# Patient Record
Sex: MALE | Race: WHITE | NOT HISPANIC OR LATINO | Employment: FULL TIME | URBAN - METROPOLITAN AREA
[De-identification: names, ages, dates, MRNs, and addresses within clinical notes are randomized per-mention and may not be internally consistent; named-entity substitution may affect disease eponyms.]

---

## 2017-02-24 ENCOUNTER — ALLSCRIPTS OFFICE VISIT (OUTPATIENT)
Dept: OTHER | Facility: OTHER | Age: 42
End: 2017-02-24

## 2017-03-07 ENCOUNTER — LAB CONVERSION - ENCOUNTER (OUTPATIENT)
Dept: OTHER | Facility: OTHER | Age: 42
End: 2017-03-07

## 2017-03-07 ENCOUNTER — GENERIC CONVERSION - ENCOUNTER (OUTPATIENT)
Dept: OTHER | Facility: OTHER | Age: 42
End: 2017-03-07

## 2017-03-07 LAB
25(OH)D3 SERPL-MCNC: 52 NG/ML (ref 30–100)
CHOLEST SERPL-MCNC: 230 MG/DL (ref 125–200)
CHOLEST/HDLC SERPL: 3.2 (CALC)
HDLC SERPL-MCNC: 71 MG/DL
HEPATITIS B SURFACE ANTIGEN (HISTORICAL): NORMAL
HEPATITIS C ANTIBODY (HISTORICAL): NORMAL
HIV AG/AB, 4TH GEN (HISTORICAL): NORMAL
LDL CHOLESTEROL (HISTORICAL): 139 MG/DL (CALC)
NON-HDL-CHOL (CHOL-HDL) (HISTORICAL): 159 MG/DL (CALC)
SIGNAL TO CUT-OFF (HISTORICAL): 0.01
TRIGL SERPL-MCNC: 99 MG/DL

## 2017-05-21 ENCOUNTER — APPOINTMENT (EMERGENCY)
Dept: RADIOLOGY | Facility: HOSPITAL | Age: 42
End: 2017-05-21
Payer: COMMERCIAL

## 2017-05-21 ENCOUNTER — HOSPITAL ENCOUNTER (EMERGENCY)
Facility: HOSPITAL | Age: 42
Discharge: HOME | End: 2017-05-21
Attending: EMERGENCY MEDICINE | Admitting: EMERGENCY MEDICINE
Payer: COMMERCIAL

## 2017-05-21 VITALS
DIASTOLIC BLOOD PRESSURE: 82 MMHG | TEMPERATURE: 98.2 F | HEART RATE: 88 BPM | RESPIRATION RATE: 18 BRPM | OXYGEN SATURATION: 98 % | WEIGHT: 170.42 LBS | SYSTOLIC BLOOD PRESSURE: 140 MMHG

## 2017-05-21 DIAGNOSIS — S86.011A ACHILLES RUPTURE, RIGHT, INITIAL ENCOUNTER: Primary | ICD-10-CM

## 2017-05-21 PROCEDURE — 73610 X-RAY EXAM OF ANKLE: CPT

## 2017-05-21 PROCEDURE — 99283 EMERGENCY DEPT VISIT LOW MDM: CPT

## 2017-05-21 RX ORDER — OXYCODONE HYDROCHLORIDE AND ACETAMINOPHEN 5; 325 MG/1; MG/1
1 TABLET ORAL EVERY 6 HOURS PRN
Qty: 12 TABLET | Refills: 0 | Status: SHIPPED | OUTPATIENT
Start: 2017-05-21 | End: 2017-05-31

## 2017-05-21 RX ORDER — IBUPROFEN 800 MG/1
800 TABLET ORAL EVERY 6 HOURS PRN
Qty: 30 TABLET | Refills: 0 | Status: SHIPPED | OUTPATIENT
Start: 2017-05-21 | End: 2018-02-28 | Stop reason: SDUPTHER

## 2017-05-21 RX ORDER — CITALOPRAM 40 MG/1
40 TABLET ORAL DAILY
COMMUNITY
End: 2018-02-28 | Stop reason: DRUGHIGH

## 2017-05-21 RX ORDER — IBUPROFEN 400 MG/1
800 TABLET ORAL ONCE
Status: COMPLETED | OUTPATIENT
Start: 2017-05-21 | End: 2017-05-21

## 2017-05-21 RX ADMIN — IBUPROFEN 800 MG: 400 TABLET ORAL at 19:49

## 2017-05-23 ENCOUNTER — TRANSCRIBE ORDERS (OUTPATIENT)
Dept: ADMINISTRATIVE | Facility: HOSPITAL | Age: 42
End: 2017-05-23

## 2017-05-23 DIAGNOSIS — M66.371 SPONTANEOUS RUPTURE OF FLEXOR TENDONS, RIGHT ANKLE AND FOOT: Primary | ICD-10-CM

## 2017-05-25 ENCOUNTER — HOSPITAL ENCOUNTER (OUTPATIENT)
Dept: MRI IMAGING | Facility: HOSPITAL | Age: 42
Discharge: HOME | End: 2017-05-25
Payer: COMMERCIAL

## 2017-05-25 DIAGNOSIS — M66.371 SPONTANEOUS RUPTURE OF FLEXOR TENDONS, RIGHT ANKLE AND FOOT: ICD-10-CM

## 2017-05-25 PROCEDURE — 73721 MRI JNT OF LWR EXTRE W/O DYE: CPT

## 2017-05-30 ENCOUNTER — GENERIC CONVERSION - ENCOUNTER (OUTPATIENT)
Dept: OTHER | Facility: OTHER | Age: 42
End: 2017-05-30

## 2018-01-13 VITALS
WEIGHT: 164.38 LBS | BODY MASS INDEX: 24.35 KG/M2 | HEIGHT: 69 IN | OXYGEN SATURATION: 99 % | HEART RATE: 73 BPM | SYSTOLIC BLOOD PRESSURE: 126 MMHG | DIASTOLIC BLOOD PRESSURE: 78 MMHG

## 2018-01-16 NOTE — RESULT NOTES
Verified Results  (Q) LIPID PANEL WITH REFLEX TO DIRECT LDL [id number] 04:47PM [facility]     Test Name Result Flag Reference   CHOLESTEROL, TOTAL 230 mg/dL H 125-200   HDL CHOLESTEROL 71 mg/dL  > OR = 40   TRIGLICERIDES 99 mg/dL  <644   LDL-CHOLESTEROL 139 mg/dL (calc) H <130   Desirable range <100 mg/dL for patients with CHD or  diabetes and <70 mg/dL for diabetic patients with  known heart disease  CHOL/HDLC RATIO 3 2 (calc)  < OR = 5 0   NON HDL CHOLESTEROL 159 mg/dL (calc)     Target for non-HDL cholesterol is 30 mg/dL higher than   LDL cholesterol target  *(Q) VITAMIN D, 25-HYDROXY, LC/MS/MS [id number] 04:47PM [facility]   REPORT COMMENT:  FASTING:YES     Test Name Result Flag Reference   VITAMIN D, 25-OH, TOTAL 52 ng/mL     Vitamin D Status         25-OH Vitamin D:     Deficiency:                    <20 ng/mL  Insufficiency:             20 - 29 ng/mL  Optimal:                 > or = 30 ng/mL     For 25-OH Vitamin D testing on patients on   D2-supplementation and patients for whom quantitation   of D2 and D3 fractions is required, the QuestAssureD(TM)  25-OH VIT D, (D2,D3), LC/MS/MS is recommended: order   code 91361 (patients >2yrs)  For more information on this test, go to:  [email or web address] [email or web address]  (This link is being provided for   informational/educational purposes only )     (Q) HEPATITIS C ANTIBODY [id number] 04:47PM [facility]     Test Name Result Flag Reference   HEPATITIS C ANTIBODY NON-REACTIVE  NON-REACTIVE   SIGNAL TO CUT-OFF 0 01  <1 00     (1) HEP B SURFACE ANTIGEN [id number] 04:47PM [facility]     Test Name Result Flag Reference   HEPATITIS B SURFACE$ANTIGEN NON-REACTIVE  NON-REACTIVE     (1) HIV AG/AB Rozena Tereso GEN [id number] 04:47PM [facility]     Test Name Result Flag Reference   HIV AG/AB, 4TH GEN NON-REACTIVE  NON-REACTIVE   HIV-1 antigen and HIV-1/HIV-2 antibodies were not  detected  There is no laboratory evidence of HIV  infection       PLEASE NOTE: This information has been disclosed to  you from records whose confidentiality may be  protected by state law  If your state requires such  protection, then the state law prohibits you from  making any further disclosure of the information  without the specific written consent of the person  to whom it pertains, or as otherwise permitted by law  A general authorization for the release of medical or  other information is NOT sufficient for this purpose  For additional information please refer to  http://[employer]/faq/UXJ371  (This link is being provided for informational/  educational purposes only )        The performance of this assay has not been clinically  validated in patients less than 3years old  (Q) CBC (INCLUDES DIFF/PLT) (REFL) [id number] 04:42PM Dinah Mote     Test Name Result Flag Reference   WHITE BLOOD CELL COUNT 6 6 Thousand/uL  3 8-10 8   RED BLOOD CELL COUNT 4 88 Million/uL  4 20-5 80   HEMOGLOBIN 15 6 g/dL  13 2-17 1   HEMATOCRIT 45 7 %  38 5-50 0   MCV 93 6 fL  80 0-100 0   MCH 31 9 pg  27 0-33 0   MCHC 34 1 g/dL  32 0-36 0   RDW 12 5 %  11 0-15 0   PLATELET COUNT 055 Thousand/uL  140-400   MPV 8 7 fL  7 5-12 5   ABSOLUTE NEUTROPHILS 4561 cells/uL  3021-5681   ABSOLUTE LYMPHOCYTES 1604 cells/uL  850-3900   ABSOLUTE MONOCYTES 310 cells/uL  200-950   ABSOLUTE EOSINOPHILS 99 cells/uL     ABSOLUTE BASOPHILS 26 cells/uL  0-200   NEUTROPHILS 69 1 %     LYMPHOCYTES 24 3 %     MONOCYTES 4 7 %     EOSINOPHILS 1 5 %     BASOPHILS 0 4 %       (Q) COMPREHENSIVE METABOLIC PNL W/ADJUSTED CALCIUM [id number] 04:42PM [facility] M[facility]     Test Name Result Flag Reference   GLUCOSE 84 mg/dL  65-99   Fasting reference interval   UREA NITROGEN (BUN) 14 mg/dL  7-25   CREATININE 1 04 mg/dL  0 60-1 35   eGFR NON-AFR   AMERICAN 90 mL/min/1 73m2  > OR = 60   eGFR AFRICAN AMERICAN 104 mL/min/1 73m2  > OR = 60   BUN/CREATININE RATIO   3-81   NOT APPLICABLE (calc)   SODIUM 136 mmol/L  135-146 POTASSIUM 3 9 mmol/L  3 5-5 3   CHLORIDE 99 mmol/L     CARBON DIOXIDE 25 mmol/L  20-31   CALCIUM 9 7 mg/dL  8 6-10 3   CALCIUM (ADJUSTED FOR$ALBUMIN) 9 5 mg/dL (calc)  8 6-10 2   PROTEIN, TOTAL 7 7 g/dL  6 1-8 1   ALBUMIN 4 7 g/dL  3 6-5 1   GLOBULIN 3 0 g/dL (calc)  1 9-3 7   ALBUMIN/GLOBULIN RATIO 1 6 (calc)  1 0-2 5   BILIRUBIN, TOTAL 1 0 mg/dL  0 2-1 2   ALKALINE PHOSPHATASE 56 U/L     AST 24 U/L  10-40   ALT 38 U/L  9-46     (Q) TSH, 3RD GENERATION [id number] 04:42PM [facility]ue[facility]   REPORT COMMENT:  FASTING:YES     Test Name Result Flag Reference   TSH 2 61 mIU/L  0 40-4 50       Discussion/Summary   Your blood tests look fine  Your cholesterol is very slightly high so please watch your diet       Signatures   Electronically signed by : SIM Duncan ; Mar  7 2017  3:01PM EST                       (Author)

## 2018-01-17 NOTE — RESULT NOTES
Message   His vitamin D level is quite low at 12, ideally >30   I will send for Vit d 50,000 units to be taken weekly x 8 weeks        Verified Results  (Q) CBC (INCLUDES DIFF/PLT) (REFL) [id number] 04:16PM Stanly Pat     Test Name Result Flag Reference   WHITE BLOOD CELL COUNT 6 2 Thousand/uL  3 8-10 8   RED BLOOD CELL COUNT 4 85 Million/uL  4 20-5 80   HEMOGLOBIN 14 9 g/dL  13 2-17 1   HEMATOCRIT 44 9 %  38 5-50 0   MCV 92 7 fL  80 0-100 0   MCH 30 6 pg  27 0-33 0   MCHC 33 1 g/dL  32 0-36 0   RDW 12 3 %  11 0-15 0   PLATELET COUNT 656 Thousand/uL  140-400   MPV 8 8 fL  7 5-11 5   ABSOLUTE NEUTROPHILS 4439 cells/uL  3114-4180   ABSOLUTE LYMPHOCYTES 1333 cells/uL  850-3900   ABSOLUTE MONOCYTES 304 cells/uL  200-950   ABSOLUTE EOSINOPHILS 99 cells/uL     ABSOLUTE BASOPHILS 25 cells/uL  0-200   NEUTROPHILS 71 6 %     LYMPHOCYTES 21 5 %     MONOCYTES 4 9 %     EOSINOPHILS 1 6 %     BASOPHILS 0 4 %       (Q) COMPREHENSIVE METABOLIC PNL W/ADJUSTED CALCIUM [id number] 04:16PM Stanly Pat     Test Name Result Flag Reference   GLUCOSE 75 mg/dL  65-99   Fasting reference interval   UREA NITROGEN (BUN) 10 mg/dL  7-25   CREATININE 1 08 mg/dL  0 60-1 35   eGFR NON-AFR   AMERICAN 87 mL/min/1 73m2  > OR = 60   eGFR AFRICAN AMERICAN 100 mL/min/1 73m2  > OR = 60   BUN/CREATININE RATIO   0-19   NOT APPLICABLE (calc)   SODIUM 138 mmol/L  135-146   POTASSIUM 4 1 mmol/L  3 5-5 3   CHLORIDE 103 mmol/L     CARBON DIOXIDE 27 mmol/L  19-30   CALCIUM 9 5 mg/dL  8 6-10 3   CALCIUM (ADJUSTED FOR$ALBUMIN) 9 4 mg/dL (calc)  8 6-10 2   PROTEIN, TOTAL 7 5 g/dL  6 1-8 1   ALBUMIN 4 6 g/dL  3 6-5 1   GLOBULIN 2 9 g/dL (calc)  1 9-3 7   ALBUMIN/GLOBULIN RATIO 1 6 (calc)  1 0-2 5   BILIRUBIN, TOTAL 0 7 mg/dL  0 2-1 2   ALKALINE PHOSPHATASE 53 U/L     AST 19 U/L  10-40   ALT 35 U/L  9-46     (Q) TSH, 3RD GENERATION [id number] 04:16PM Stanly Pat     Test Name Result Flag Reference   TSH 2 23 mIU/L  0 40-4 50     *(Q) VITAMIN D, 25-HYDROXY, LC/MS/MS [id number] 04:16PM [facility]i Barkley   REPORT COMMENT:  FASTING:NO     Test Name Result Flag Reference   VITAMIN D, 25-OH, TOTAL 12 ng/mL L    Vitamin D Status         25-OH Vitamin D:     Deficiency:                    <20 ng/mL  Insufficiency:             20 - 29 ng/mL  Optimal:                 > or = 30 ng/mL     For 25-OH Vitamin D testing on patients on   D2-supplementation and patients for whom quantitation   of D2 and D3 fractions is required, the QuestAssureD(TM)  25-OH VIT D, (D2,D3), LC/MS/MS is recommended: order   code 78481 (patients >2yrs)  For more information on this test, go to:  [email or web address]/FSP004  (This link is being provided for   informational/educational purposes only )     (1) HIV AG/AB COMBO, 4TH GEN [id number] 04:16PM Abi Barkley     Test Name Result Flag Reference   HIV AG/AB, 4TH GEN NON-REACTIVE  NON-REACTIVE   A Nonreactive HIV Ag/Ab result does not exclude  HIV infection since the time frame for seroconversion  is variable  If acute HIV infection is suspected,  a HIV-1 RNA Qualitative TMA test is recommended  PLEASE NOTE: This information has been disclosed to you  from records whose confidentiality may be protected by  state law  If your state requires such protection, then  the state law prohibits you from making any further  disclosure of the information without the specific  written consent of the person to whom it pertains, or  as otherwise permitted by law  A general authorization  for the release of medical or other information is NOT  sufficient for this purpose  The performance of this assay has not been clinically  validated in patients less than 3years old  For additional information please refer to  http://[email or web address]/PJO802  (This link is being provided for informational/  educational purposes only )       Plan  Vitamin D deficiency    · Vitamin D (Ergocalciferol) 13208 UNIT Oral Capsule; take 1 capsule weekly    Signatures   Electronically signed by : SIM Chang ; Feb 29 2016  1:29PM EST                       (Author)

## 2018-01-17 NOTE — PROGRESS NOTES
Assessment    1  Encounter for preventive health examination (V70 0) (Z00 00)    Plan  Screening cholesterol level, Screening for diabetes mellitus, Screening for STD (sexually  transmitted disease), Vitamin D deficiency    · (1) HEP B SURFACE ANTIGEN; Status:Active; Requested for:27Skg1275;    · (Q) CBC (INCLUDES DIFF/PLT) (REFL); Status:Active; Requested for:25Rib7201;    · (Q) COMPREHENSIVE METABOLIC PNL W/ADJUSTED CALCIUM; Status:Active; Requested for:01Usq2269;    · (Q) HEPATITIS C ANTIBODY; Status:Active; Requested for:49Emf9898;    · (Q) HIV-1/2 ANTIGEN AND ANTIBODIES, FOURTH GENERATION, with REFLEXES; [Do  Not Release]; Status:Active; Requested for:72Fjo2067;    · (Q) LIPID PANEL WITH REFLEX TO DIRECT LDL; Status:Active; Requested  for:68Imf7927;    · *(Q) VITAMIN D, 25-HYDROXY, LC/MS/MS; Status:Active; Requested for:37Mmp7640;   Sebaceous cyst    · [facility] - Jose Ramon MOLINA, Prerna Prado  (General Surgery) Physician Referral  Consult  Status: Active   Requested for: 15HWX3822  Care Summary provided  : Yes    Discussion/Summary  Impression: health maintenance visit  Currently, he eats an adequate diet and has an inadequate exercise regimen  Prostate cancer screening: PSA is not indicated  Colorectal cancer screening: colorectal cancer screening is not indicated  Screening lab work includes glucose, lipid profile and 25-hydroxyvitamin D  The patient declines immunizations  Advice and education were given regarding aerobic exercise  Rto 1 year  Patient is able to Self-Care  Chief Complaint  Wellness  History of Present Illness  HM, Adult Male: The patient is being seen for a health maintenance evaluation  The last health maintenance visit was >2 year(s) ago  Social History: He is   Work status: working full time and occupation: sales  The patient is a former cigarette smoker  He reports occasional alcohol use  He has never used illicit drugs  General Health:  The patient's health since the last visit is described as good  He has regular dental visits  He complains of vision problems  Vision care includes an eye examination more than a year ago  He denies hearing loss  Immunizations status: not up to date  Lifestyle:  He consumes a diverse and healthy diet  He is on a diet and is generally adherent  He does not have any weight concerns  He does not exercise regularly  He exercises less than three times a week for 30 or more minutes per session  Exercise includes walking  He does not use tobacco  The patient is a former cigarette smoker and stopped 10 years ago  He consumes alcohol  He reports frequent alcohol use and drinking 1-2 drinks per day  He typically drinks beer and wine  Alcohol concern: The patient has no concerns about alcohol abuse  He denies drug use  He has never used illicit drugs  Reproductive health:  the patient is not sexually active  Screening: Prostate cancer screening includes no previous evaluation  Colorectal cancer screening includes no previous screening  Metabolic screening includes lipid profile performed within the past five years, glucose screening performed last year and thyroid function test performed last year  Cardiovascular risk factors: no hypertension and no diabetes  HPI: Headaches resolved  Could have been anxiety related  Lamictal and Celexa increased in December, eventually would like to get off the meds       Review of Systems    Constitutional: no fever, not feeling poorly, no recent weight gain, no chills, not feeling tired and no recent weight loss  Cardiovascular: no chest pain and no palpitations  Respiratory: no shortness of breath and no cough  Gastrointestinal: no abdominal pain, no constipation and no diarrhea  Integumentary: cyst on the back getting larger  Neurological: no headache  Psychiatric: anxiety  Active Problems    1  Anxiety disorder (300 00) (F41 9)   2  Flu vaccine need (V04 81) (Z23)   3   Screening cholesterol level (V77 91) (Z13 220)   4  Screening for diabetes mellitus (V77 1) (Z13 1)   5  Screening for STD (sexually transmitted disease) (V74 5) (Z11 3)   6  Sebaceous cyst (706 2) (L72 3)   7  Vitamin D deficiency (268 9) (E55 9)    Past Medical History    · History of Acute upper respiratory infection (465 9) (J06 9)   · History of fatigue (V13 89) (R56 470)   · History of New onset headache (784 0) (R51)   · History of Palpitations (785 1) (R00 2)    Surgical History    · Denied: History Of Prior Surgery    Family History  Mother    · Family history of colon cancer (V16 0) (Z80 0)  Father    · Family history of diabetes mellitus (V18 0) (Z83 3)   · Family history of lung cancer (V16 1) (Z80 1)  Maternal Grandfather    · Family history of myocardial infarction (V17 3) (Z82 49)  Paternal Aunt    · Family history of lung cancer (V16 1) (Z80 1)   · Family history of malignant neoplasm of brain (V16 8) (Z80 8)   · Family history of malignant neoplasm of breast (V16 3) (Z80 3)  Maternal Cousin    · Family history of colon cancer (V16 0) (Z80 0)    Social History    · Never a smoker    Current Meds   1  ALPRAZolam 0 25 MG Oral Tablet; Therapy: 90CMO6186 to (Last Rx:71Mbq2322)  Requested for: 31ALX3689 Ordered   2  Citalopram Hydrobromide 40 MG Oral Tablet; Therapy: 87RYS2755 to (Evaluate:23Nov2014)  Requested for: 94XBL7813 Recorded   3  LamoTRIgine 200 MG Oral Tablet; TAKE 1 TABLET DAILY; Therapy: 21LDW4855 to  Requested for: 41Ybx2524 Recorded   4  Vitamin D3 5000 UNIT Oral Tablet; Take 1 tablet daily; Therapy: 15JMV3238 to (Evaluate:26Mar2017) Recorded    Allergies    1  Azithromycin PACK    Vitals   Recorded: 24Feb2017 02:09PM   Heart Rate 73   Systolic 036   Diastolic 78   Height 5 ft 8 5 in   Weight 164 lb 6 oz   BMI Calculated 24 63   BSA Calculated 1 89   O2 Saturation 99     Physical Exam    Constitutional   General appearance: No acute distress, well appearing and well nourished      Head and Face   Head and face: Normal     Eyes   Conjunctiva and lids: No erythema, swelling or discharge  Ears, Nose, Mouth, and Throat   Otoscopic examination: Tympanic membranes translucent with normal light reflex  Canals patent without erythema  Oropharynx: Normal with no erythema, edema, exudate or lesions  Neck   Neck: Supple, symmetric, trachea midline, no masses  Thyroid: Normal, no thyromegaly  Pulmonary   Respiratory effort: No increased work of breathing or signs of respiratory distress  Auscultation of lungs: Clear to auscultation  Cardiovascular   Auscultation of heart: Normal rate and rhythm, normal S1 and S2, no murmurs  Abdomen   Abdomen: Non-tender, no masses  Lymphatic   Palpation of lymph nodes in neck: No lymphadenopathy  Musculoskeletal   Gait and station: Normal     Skin   Examination of the skin for lesions: Abnormal   sebaceous cyst on the mid back 3cm mass on the mid back, not fluctuant, tender     Psychiatric   Orientation to person, place and time: Normal     Mood and affect: Normal        Signatures   Electronically signed by : Merlinda Abraham, M D ; Feb 24 2017  2:58PM EST                       (Author)

## 2018-02-28 ENCOUNTER — OFFICE VISIT (OUTPATIENT)
Dept: INTERNAL MEDICINE CLINIC | Facility: CLINIC | Age: 43
End: 2018-02-28
Payer: COMMERCIAL

## 2018-02-28 VITALS
SYSTOLIC BLOOD PRESSURE: 110 MMHG | WEIGHT: 169.4 LBS | BODY MASS INDEX: 25.09 KG/M2 | HEIGHT: 69 IN | DIASTOLIC BLOOD PRESSURE: 68 MMHG | HEART RATE: 79 BPM | OXYGEN SATURATION: 98 %

## 2018-02-28 DIAGNOSIS — Z13.220 SCREENING, LIPID: ICD-10-CM

## 2018-02-28 DIAGNOSIS — F41.9 ANXIETY DISORDER, UNSPECIFIED TYPE: ICD-10-CM

## 2018-02-28 DIAGNOSIS — Z00.00 WELLNESS EXAMINATION: Primary | ICD-10-CM

## 2018-02-28 DIAGNOSIS — E55.9 VITAMIN D DEFICIENCY: ICD-10-CM

## 2018-02-28 DIAGNOSIS — Z11.3 SCREEN FOR STD (SEXUALLY TRANSMITTED DISEASE): ICD-10-CM

## 2018-02-28 DIAGNOSIS — Z13.1 SCREENING FOR DIABETES MELLITUS: ICD-10-CM

## 2018-02-28 DIAGNOSIS — L72.3 SEBACEOUS CYST: ICD-10-CM

## 2018-02-28 DIAGNOSIS — R12 HEARTBURN: ICD-10-CM

## 2018-02-28 PROCEDURE — 99396 PREV VISIT EST AGE 40-64: CPT | Performed by: INTERNAL MEDICINE

## 2018-02-28 RX ORDER — ALPRAZOLAM 0.25 MG/1
TABLET ORAL DAILY PRN
COMMUNITY
Start: 2012-02-16

## 2018-02-28 RX ORDER — LAMOTRIGINE 200 MG/1
200 TABLET ORAL DAILY
COMMUNITY
Start: 2012-02-16 | End: 2021-04-21

## 2018-02-28 RX ORDER — CITALOPRAM 20 MG/1
TABLET ORAL
Refills: 2 | COMMUNITY
Start: 2018-02-10

## 2018-02-28 RX ORDER — ACETAMINOPHEN 325 MG/1
325 TABLET ORAL EVERY 6 HOURS PRN
COMMUNITY
End: 2018-02-28 | Stop reason: ALTCHOICE

## 2018-02-28 RX ORDER — BIOTIN 1 MG
1 TABLET ORAL DAILY
COMMUNITY
Start: 2017-02-24 | End: 2018-02-28 | Stop reason: ALTCHOICE

## 2018-02-28 NOTE — PATIENT INSTRUCTIONS
Diet for Stomach Ulcers and Gastritis   AMBULATORY CARE:   A diet for stomach ulcers and gastritis  is a meal plan that limits foods that irritate your stomach  Certain foods may worsen symptoms such as stomach pain, bloating, heartburn, or indigestion  Foods to limit or avoid:  You may need to avoid acidic, spicy, or high-fat foods  Not all foods affect everyone the same way  You will need to learn which foods worsen your symptoms and limit those foods  The following are some foods that may worsen ulcer or gastritis symptoms:  · Beverages:      ¨ Whole milk and chocolate milk    ¨ Hot cocoa and cola    ¨ Any beverage with caffeine    ¨ Regular and decaffeinated coffee    ¨ Peppermint and spearmint tea    ¨ Green and black tea, with or without caffeine    ¨ Orange and grapefruit juices    ¨ Drinks that contain alcohol    · Spices and seasonings:      ¨ Black and red pepper    ¨ Chili powder    ¨ Mustard seed and nutmeg    · Other foods:      ¨ Dairy foods made from whole milk or cream    ¨ Chocolate    ¨ Spicy or strongly flavored cheeses, such as jalapeno or black pepper    ¨ Highly seasoned, high-fat meats, such as sausage, salami, carter, ham, and cold cuts    ¨ Hot chiles and peppers    ¨ Tomato products, such as tomato paste, tomato sauce, or tomato juice  Foods to include:  Eat a variety of healthy foods from all the food groups  Eat fruits, vegetables, whole grains, and fat-free or low-fat dairy foods  Whole grains include whole-wheat breads, cereals, pasta, and brown rice  Choose lean meats, poultry (chicken and turkey), fish, beans, eggs, and nuts  A healthy meal plan is low in unhealthy fats, salt, and added sugar  Healthy fats include olive oil and canola oil  Ask your dietitian for more information about a healthy diet  Other helpful guidelines:   · Do not eat right before bedtime  Stop eating at least 2 hours before bedtime  · Eat small, frequent meals    Your stomach may tolerate small, frequent meals better than large meals  © 2017 [address]  Information is for End User's use only and may not be sold, redistributed or otherwise used for commercial purposes  All illustrations and images included in CareNotes® are the copyrighted property of [employer]  or [address]  The above information is an  only  It is not intended as medical advice for individual conditions or treatments  Talk to your doctor, nurse or pharmacist before following any medical regimen to see if it is safe and effective for you

## 2018-02-28 NOTE — PROGRESS NOTES
Assessment/Plan:    No problem-specific Assessment & Plan notes found for this encounter  Ulcer free diet  F/U with psych for anxiety  Start vit D supplements  Obtain labs  Schedule with surgery for growing sebaceous cyst     Problem List Items Addressed This Visit     Anxiety disorder    Relevant Medications    citalopram (CeleXA) 20 mg tablet    ALPRAZolam (XANAX) 0 25 mg tablet    Vitamin D deficiency    Wellness examination - Primary    Heartburn            Subjective:      Patient ID: Teresita Abdi is a 38 y o  male  HPI   Wellness visit  No new issues  Had right Achilles rupture in May playing basketball and had surgery in June    The following portions of the patient's history were reviewed and updated as appropriate: allergies, current medications, past family history, past medical history, past social history, past surgical history and problem list     Review of Systems   Constitutional: Positive for fatigue  Negative for fever and unexpected weight change  Denies daytime somnolence   HENT: Negative for ear pain, hearing loss, sinus pain, sinus pressure and sore throat  Respiratory: Negative for cough, shortness of breath and wheezing  Cardiovascular: Negative for chest pain, palpitations and leg swelling  Gastrointestinal: Negative for abdominal pain, blood in stool, constipation, diarrhea, nausea and vomiting  Heartburn jhony at night x 3months relieved by PeptoBismol   Genitourinary: Negative for difficulty urinating  Musculoskeletal: Negative for arthralgias and myalgias  Neurological: Positive for light-headedness (mild occasional)  Negative for dizziness and headaches  Psychiatric/Behavioral: Positive for sleep disturbance  The patient is nervous/anxious            Objective:    /68 (BP Location: Left arm, Patient Position: Sitting, Cuff Size: Standard)   Pulse 79   Ht 5' 8 5" (1 74 m)   Wt 76 8 kg (169 lb 6 4 oz)   SpO2 98%   BMI 25 38 kg/m²      Physical Exam   Constitutional: He is oriented to person, place, and time  He appears well-developed and well-nourished  HENT:   Head: Normocephalic and atraumatic  Right Ear: External ear normal    Left Ear: External ear normal    Mouth/Throat: Oropharynx is clear and moist    Eyes: Conjunctivae are normal    Neck: Neck supple  Cardiovascular: Normal rate, regular rhythm and normal heart sounds  No murmur heard  Pulmonary/Chest: Effort normal and breath sounds normal  No respiratory distress  He has no wheezes  He has no rales  Abdominal: Soft  Bowel sounds are normal  He exhibits no distension and no mass  There is no tenderness  There is no rebound and no guarding  Musculoskeletal: Normal range of motion  Neurological: He is alert and oriented to person, place, and time  Skin: Skin is warm and dry  Movable cyst not tender, red on the mid back   Psychiatric: He has a normal mood and affect   His behavior is normal  Judgment and thought content normal

## 2018-03-19 LAB
25(OH)D3 SERPL-MCNC: 35 NG/ML (ref 30–100)
ALBUMIN SERPL-MCNC: 4.1 G/DL (ref 3.6–5.1)
ALBUMIN/GLOB SERPL: 1.4 (CALC) (ref 1–2.5)
ALP SERPL-CCNC: 48 U/L (ref 40–115)
ALT SERPL-CCNC: 43 U/L (ref 9–46)
AST SERPL-CCNC: 24 U/L (ref 10–40)
BASOPHILS # BLD AUTO: 39 CELLS/UL (ref 0–200)
BASOPHILS NFR BLD AUTO: 0.7 %
BILIRUB SERPL-MCNC: 0.6 MG/DL (ref 0.2–1.2)
BUN SERPL-MCNC: 12 MG/DL (ref 7–25)
BUN/CREAT SERPL: NORMAL (CALC) (ref 6–22)
CALCIUM SERPL-MCNC: 9.3 MG/DL (ref 8.6–10.3)
CHLORIDE SERPL-SCNC: 105 MMOL/L (ref 98–110)
CHOLEST SERPL-MCNC: 212 MG/DL
CHOLEST/HDLC SERPL: 3.5 (CALC)
CO2 SERPL-SCNC: 28 MMOL/L (ref 20–31)
CREAT SERPL-MCNC: 1.2 MG/DL (ref 0.6–1.35)
EOSINOPHIL # BLD AUTO: 253 CELLS/UL (ref 15–500)
EOSINOPHIL NFR BLD AUTO: 4.6 %
ERYTHROCYTE [DISTWIDTH] IN BLOOD BY AUTOMATED COUNT: 12.3 % (ref 11–15)
GLOBULIN SER CALC-MCNC: 2.9 G/DL (CALC) (ref 1.9–3.7)
GLUCOSE SERPL-MCNC: 89 MG/DL (ref 65–99)
HBV SURFACE AG SERPL QL IA: NORMAL
HCT VFR BLD AUTO: 44.3 % (ref 38.5–50)
HCV AB S/CO SERPL IA: 0.01
HCV AB SERPL QL IA: NORMAL
HDLC SERPL-MCNC: 61 MG/DL
HGB BLD-MCNC: 15.4 G/DL (ref 13.2–17.1)
HIV 1+2 AB+HIV1 P24 AG SERPL QL IA: NORMAL
LDLC SERPL CALC-MCNC: 136 MG/DL (CALC)
LYMPHOCYTES # BLD AUTO: 1689 CELLS/UL (ref 850–3900)
LYMPHOCYTES NFR BLD AUTO: 30.7 %
MCH RBC QN AUTO: 32 PG (ref 27–33)
MCHC RBC AUTO-ENTMCNC: 34.8 G/DL (ref 32–36)
MCV RBC AUTO: 92.1 FL (ref 80–100)
MONOCYTES # BLD AUTO: 413 CELLS/UL (ref 200–950)
MONOCYTES NFR BLD AUTO: 7.5 %
NEUTROPHILS # BLD AUTO: 3108 CELLS/UL (ref 1500–7800)
NEUTROPHILS NFR BLD AUTO: 56.5 %
NONHDLC SERPL-MCNC: 151 MG/DL (CALC)
PLATELET # BLD AUTO: 245 THOUSAND/UL (ref 140–400)
PMV BLD REES-ECKER: 10.1 FL (ref 7.5–12.5)
POTASSIUM SERPL-SCNC: 4.5 MMOL/L (ref 3.5–5.3)
PROT SERPL-MCNC: 7 G/DL (ref 6.1–8.1)
RBC # BLD AUTO: 4.81 MILLION/UL (ref 4.2–5.8)
SL AMB EGFR AFRICAN AMERICAN: 87 ML/MIN/1.73M2
SL AMB EGFR NON AFRICAN AMERICAN: 75 ML/MIN/1.73M2
SODIUM SERPL-SCNC: 139 MMOL/L (ref 135–146)
TRIGL SERPL-MCNC: 63 MG/DL
WBC # BLD AUTO: 5.5 THOUSAND/UL (ref 3.8–10.8)

## 2019-04-12 ENCOUNTER — OFFICE VISIT (OUTPATIENT)
Dept: INTERNAL MEDICINE CLINIC | Facility: CLINIC | Age: 44
End: 2019-04-12
Payer: COMMERCIAL

## 2019-04-12 VITALS
BODY MASS INDEX: 25.17 KG/M2 | SYSTOLIC BLOOD PRESSURE: 120 MMHG | WEIGHT: 168 LBS | DIASTOLIC BLOOD PRESSURE: 66 MMHG | OXYGEN SATURATION: 99 % | HEART RATE: 76 BPM

## 2019-04-12 DIAGNOSIS — Z13.220 SCREENING, LIPID: ICD-10-CM

## 2019-04-12 DIAGNOSIS — R12 HEARTBURN: ICD-10-CM

## 2019-04-12 DIAGNOSIS — F41.1 GENERALIZED ANXIETY DISORDER: ICD-10-CM

## 2019-04-12 DIAGNOSIS — R43.0 ANOSMIA: ICD-10-CM

## 2019-04-12 DIAGNOSIS — Z00.00 WELLNESS EXAMINATION: Primary | ICD-10-CM

## 2019-04-12 DIAGNOSIS — Z11.3 SCREEN FOR STD (SEXUALLY TRANSMITTED DISEASE): ICD-10-CM

## 2019-04-12 DIAGNOSIS — R53.83 FATIGUE, UNSPECIFIED TYPE: ICD-10-CM

## 2019-04-12 DIAGNOSIS — F41.9 ANXIETY DISORDER, UNSPECIFIED TYPE: ICD-10-CM

## 2019-04-12 DIAGNOSIS — L72.3 SEBACEOUS CYST: ICD-10-CM

## 2019-04-12 DIAGNOSIS — Z13.1 SCREENING FOR DIABETES MELLITUS: ICD-10-CM

## 2019-04-12 DIAGNOSIS — E55.9 VITAMIN D DEFICIENCY: ICD-10-CM

## 2019-04-12 DIAGNOSIS — Z23 NEED FOR VACCINATION: ICD-10-CM

## 2019-04-12 PROCEDURE — 99396 PREV VISIT EST AGE 40-64: CPT | Performed by: INTERNAL MEDICINE

## 2019-05-31 LAB
25(OH)D3 SERPL-MCNC: 25 NG/ML (ref 30–100)
ALBUMIN SERPL-MCNC: 4.4 G/DL (ref 3.6–5.1)
ALBUMIN/GLOB SERPL: 1.6 (CALC) (ref 1–2.5)
ALP SERPL-CCNC: 43 U/L (ref 40–115)
ALT SERPL-CCNC: 45 U/L (ref 9–46)
AST SERPL-CCNC: 25 U/L (ref 10–40)
BASOPHILS # BLD AUTO: 31 CELLS/UL (ref 0–200)
BASOPHILS NFR BLD AUTO: 0.6 %
BILIRUB SERPL-MCNC: 0.6 MG/DL (ref 0.2–1.2)
BUN SERPL-MCNC: 11 MG/DL (ref 7–25)
BUN/CREAT SERPL: NORMAL (CALC) (ref 6–22)
CALCIUM SERPL-MCNC: 9.5 MG/DL (ref 8.6–10.3)
CHLORIDE SERPL-SCNC: 104 MMOL/L (ref 98–110)
CHOLEST SERPL-MCNC: 223 MG/DL
CHOLEST/HDLC SERPL: 3.5 (CALC)
CO2 SERPL-SCNC: 29 MMOL/L (ref 20–32)
CREAT SERPL-MCNC: 1.12 MG/DL (ref 0.6–1.35)
EOSINOPHIL # BLD AUTO: 92 CELLS/UL (ref 15–500)
EOSINOPHIL NFR BLD AUTO: 1.8 %
ERYTHROCYTE [DISTWIDTH] IN BLOOD BY AUTOMATED COUNT: 12.2 % (ref 11–15)
GLOBULIN SER CALC-MCNC: 2.7 G/DL (CALC) (ref 1.9–3.7)
GLUCOSE SERPL-MCNC: 92 MG/DL (ref 65–99)
HBV SURFACE AG SERPL QL IA: NORMAL
HCT VFR BLD AUTO: 43.1 % (ref 38.5–50)
HCV AB S/CO SERPL IA: 0.01
HCV AB SERPL QL IA: NORMAL
HDLC SERPL-MCNC: 63 MG/DL
HGB BLD-MCNC: 14.6 G/DL (ref 13.2–17.1)
HIV 1+2 AB+HIV1 P24 AG SERPL QL IA: NORMAL
LDLC SERPL CALC-MCNC: 144 MG/DL (CALC)
LYMPHOCYTES # BLD AUTO: 1265 CELLS/UL (ref 850–3900)
LYMPHOCYTES NFR BLD AUTO: 24.8 %
MCH RBC QN AUTO: 31.7 PG (ref 27–33)
MCHC RBC AUTO-ENTMCNC: 33.9 G/DL (ref 32–36)
MCV RBC AUTO: 93.7 FL (ref 80–100)
MONOCYTES # BLD AUTO: 367 CELLS/UL (ref 200–950)
MONOCYTES NFR BLD AUTO: 7.2 %
NEUTROPHILS # BLD AUTO: 3346 CELLS/UL (ref 1500–7800)
NEUTROPHILS NFR BLD AUTO: 65.6 %
NONHDLC SERPL-MCNC: 160 MG/DL (CALC)
PLATELET # BLD AUTO: 239 THOUSAND/UL (ref 140–400)
PMV BLD REES-ECKER: 9.9 FL (ref 7.5–12.5)
POTASSIUM SERPL-SCNC: 4.4 MMOL/L (ref 3.5–5.3)
PROT SERPL-MCNC: 7.1 G/DL (ref 6.1–8.1)
RBC # BLD AUTO: 4.6 MILLION/UL (ref 4.2–5.8)
RPR SER QL: NORMAL
SL AMB EGFR AFRICAN AMERICAN: 94 ML/MIN/1.73M2
SL AMB EGFR NON AFRICAN AMERICAN: 81 ML/MIN/1.73M2
SODIUM SERPL-SCNC: 137 MMOL/L (ref 135–146)
TRIGL SERPL-MCNC: 63 MG/DL
TSH SERPL-ACNC: 2.51 MIU/L (ref 0.4–4.5)
WBC # BLD AUTO: 5.1 THOUSAND/UL (ref 3.8–10.8)

## 2020-03-31 ENCOUNTER — TELEMEDICINE (OUTPATIENT)
Dept: INTERNAL MEDICINE CLINIC | Facility: CLINIC | Age: 45
End: 2020-03-31
Payer: COMMERCIAL

## 2020-03-31 DIAGNOSIS — J06.9 VIRAL UPPER RESPIRATORY TRACT INFECTION: Primary | ICD-10-CM

## 2020-03-31 DIAGNOSIS — Z20.828 EXPOSURE TO SARS-ASSOCIATED CORONAVIRUS: ICD-10-CM

## 2020-03-31 PROCEDURE — 99213 OFFICE O/P EST LOW 20 MIN: CPT | Performed by: INTERNAL MEDICINE

## 2020-03-31 PROCEDURE — 87635 SARS-COV-2 COVID-19 AMP PRB: CPT

## 2020-03-31 RX ORDER — ALPRAZOLAM 0.25 MG/1
TABLET ORAL
COMMUNITY
Start: 2020-02-01 | End: 2020-07-02 | Stop reason: SDUPTHER

## 2020-03-31 RX ORDER — CITALOPRAM 20 MG/1
TABLET ORAL
COMMUNITY
Start: 2020-03-28 | End: 2020-07-02 | Stop reason: SDUPTHER

## 2020-03-31 RX ORDER — LAMOTRIGINE 150 MG/1
TABLET ORAL
COMMUNITY
Start: 2020-01-04 | End: 2020-03-31 | Stop reason: ALTCHOICE

## 2020-03-31 RX ORDER — LAMOTRIGINE 100 MG/1
TABLET ORAL
COMMUNITY
Start: 2020-03-28 | End: 2021-04-23 | Stop reason: DRUGHIGH

## 2020-03-31 NOTE — PROGRESS NOTES
Virtual Regular Visit    Problem List Items Addressed This Visit     None      Visit Diagnoses     Viral upper respiratory tract infection    -  Primary    Exposure to SARS-associated coronavirus        Relevant Orders    MISC COVID-19 TEST- Collected at [facility] or [facility]      Advised to get tested for COVID  Stay home and do not go back to work until test is back  Tylenol for fever and continue monitoring temp  May take OTC meds for the cough  Home self isolation, precautions discussed exp living with elderly mother  Call with any new, worsening symptoms           Reason for visit is chest congestion    Encounter provider Alethea Shore MD    Provider located at [address] [place] [id number]      Recent Visits  No visits were found meeting these conditions  Showing recent visits within past 7 days and meeting all other requirements     Today's Visits  Date Type Provider Dept   03/31/20 Telemedicine Alethea Shore MD [facility] today's visits and meeting all other requirements     Future Appointments  No visits were found meeting these conditions  Showing future appointments within next 150 days and meeting all other requirements        The patient was identified by name and date of birth  Jaja Engle Race was informed that this is a telemedicine visit and that the visit is being conducted through [employer] and patient was informed that this is not a secure, HIPAA-complaint platform  he agrees to proceed     My office door was closed  No one else was in the room  He acknowledged consent and understanding of privacy and security of the video platform  The patient has agreed to participate and understands they can discontinue the visit at any time  Patient is aware this is a billable service       Subjective  Anita Medina is a 45 y o  male with chest congestion  A week ago, felt fatigued for a day which improved on its own  5 days ago, experienced chest congestion and tightness  with a mild cough which has gotten worse  Pain in the chest, ribs, +SOB  Felt sick to the stomach the following day with nausea   Poor appetite with 8lb weight loss in the past week  No abdominal pain, no diarrhea  No temp >100 but feels heat flashes  No sinus congestion, drainage  Mild sore throat 2 days ago which resolved  +headaches with seasonal allergies  No OTC meds except Tylenol one day  Staying well hydrated  No contacts with any COVID patients or PUI for COVID  Wife was in [place] the other week, asymptomatic  No personal travel in the past few weeks  Stayed home today, staying with his [de-identified] y/o mother    No past medical history on file  Past Surgical History:   Procedure Laterality Date    ACHILLES TENDON REPAIR Right        Current Outpatient Medications   Medication Sig Dispense Refill    ALPRAZolam (XANAX) 0 25 mg tablet Take by mouth      citalopram (CeleXA) 20 mg tablet TK 1 T PO  QAM  2    lamoTRIgine (LaMICtal) 200 MG tablet Take 200 mg by mouth daily        No current facility-administered medications for this visit  Allergies   Allergen Reactions    Azithromycin Other (See Comments)       Review of Systems   Constitutional: Positive for fatigue and unexpected weight change  Negative for chills and fever  HENT: Negative for congestion, sinus pressure and sore throat  Respiratory: Positive for cough, chest tightness and shortness of breath  Gastrointestinal: Positive for nausea  Negative for abdominal pain, constipation, diarrhea and vomiting  Physical Exam   Constitutional: He is oriented to person, place, and time  No distress  Neurological: He is alert and oriented to person, place, and time  Psychiatric: He has a normal mood and affect  His behavior is normal         I spent 15 minutes with the patient during this visit      [id number]

## 2020-03-31 NOTE — LETTER
March 31, 2020     Patient: Chirag Prakash Race   YOB: 1975   Date of Visit: 3/31/2020       To Whom it May Concern:    Monica Gold is under my professional care  He was seen in my office on 3/31/2020  He was advised to stay home and not return to work until testing for coronavirus is back  Further recommendations to be give after results available  If you have any questions or concerns, please don't hesitate to call           Sincerely,          Cody Cheng MD        CC: No Recipients

## 2020-04-02 LAB — SARS-COV-2 RNA SPEC QL NAA+PROBE: NOT DETECTED

## 2020-11-30 ENCOUNTER — TELEMEDICINE (OUTPATIENT)
Dept: INTERNAL MEDICINE CLINIC | Facility: CLINIC | Age: 45
End: 2020-11-30
Payer: COMMERCIAL

## 2020-11-30 DIAGNOSIS — B34.9 VIRAL INFECTION, UNSPECIFIED: Primary | ICD-10-CM

## 2020-11-30 DIAGNOSIS — B34.9 VIRAL INFECTION, UNSPECIFIED: ICD-10-CM

## 2020-11-30 PROCEDURE — U0003 INFECTIOUS AGENT DETECTION BY NUCLEIC ACID (DNA OR RNA); SEVERE ACUTE RESPIRATORY SYNDROME CORONAVIRUS 2 (SARS-COV-2) (CORONAVIRUS DISEASE [COVID-19]), AMPLIFIED PROBE TECHNIQUE, MAKING USE OF HIGH THROUGHPUT TECHNOLOGIES AS DESCRIBED BY CMS-2020-01-R: HCPCS | Performed by: INTERNAL MEDICINE

## 2020-11-30 PROCEDURE — 99213 OFFICE O/P EST LOW 20 MIN: CPT | Performed by: INTERNAL MEDICINE

## 2020-12-01 LAB — SARS-COV-2 RNA SPEC QL NAA+PROBE: NOT DETECTED

## 2021-04-13 DIAGNOSIS — Z23 ENCOUNTER FOR IMMUNIZATION: ICD-10-CM

## 2021-04-21 RX ORDER — LAMOTRIGINE 150 MG/1
150 TABLET ORAL
COMMUNITY
Start: 2021-03-30

## 2021-04-23 ENCOUNTER — OFFICE VISIT (OUTPATIENT)
Dept: INTERNAL MEDICINE CLINIC | Facility: CLINIC | Age: 46
End: 2021-04-23
Payer: COMMERCIAL

## 2021-04-23 VITALS
WEIGHT: 161.8 LBS | DIASTOLIC BLOOD PRESSURE: 82 MMHG | BODY MASS INDEX: 23.16 KG/M2 | SYSTOLIC BLOOD PRESSURE: 122 MMHG | RESPIRATION RATE: 16 BRPM | OXYGEN SATURATION: 99 % | HEIGHT: 70 IN | HEART RATE: 101 BPM

## 2021-04-23 DIAGNOSIS — L98.9 SKIN LESION OF BACK: ICD-10-CM

## 2021-04-23 DIAGNOSIS — Z00.00 LABORATORY EXAM ORDERED AS PART OF ROUTINE GENERAL MEDICAL EXAMINATION: ICD-10-CM

## 2021-04-23 DIAGNOSIS — Z00.00 ANNUAL PHYSICAL EXAM: Primary | ICD-10-CM

## 2021-04-23 DIAGNOSIS — Z11.4 ENCOUNTER FOR SCREENING FOR HIV: ICD-10-CM

## 2021-04-23 DIAGNOSIS — R12 HEARTBURN: ICD-10-CM

## 2021-04-23 DIAGNOSIS — R07.9 CHEST PAIN, UNSPECIFIED TYPE: ICD-10-CM

## 2021-04-23 PROBLEM — Z11.3 SCREEN FOR STD (SEXUALLY TRANSMITTED DISEASE): Status: RESOLVED | Noted: 2018-02-28 | Resolved: 2021-04-23

## 2021-04-23 PROCEDURE — 3725F SCREEN DEPRESSION PERFORMED: CPT | Performed by: INTERNAL MEDICINE

## 2021-04-23 PROCEDURE — 99396 PREV VISIT EST AGE 40-64: CPT | Performed by: INTERNAL MEDICINE

## 2021-04-23 PROCEDURE — 99214 OFFICE O/P EST MOD 30 MIN: CPT | Performed by: INTERNAL MEDICINE

## 2021-04-23 PROCEDURE — 1036F TOBACCO NON-USER: CPT | Performed by: INTERNAL MEDICINE

## 2021-04-23 PROCEDURE — 3008F BODY MASS INDEX DOCD: CPT | Performed by: INTERNAL MEDICINE

## 2021-04-23 PROCEDURE — 93000 ELECTROCARDIOGRAM COMPLETE: CPT | Performed by: INTERNAL MEDICINE

## 2021-04-23 NOTE — PROGRESS NOTES
Assessment/Plan:  Normal EKG  Advised to get labs  Advised to take Pepcid daily for the next 2 weeks  Most importantly, improve diet and avoid alcohol       Problem List Items Addressed This Visit        Other    Heartburn      Other Visit Diagnoses     Annual physical exam    -  Primary    Chest pain, unspecified type        Relevant Orders    POCT ECG (Completed)    Laboratory exam ordered as part of routine general medical examination        Relevant Orders    CBC and differential    Comprehensive metabolic panel    Lipid Panel with Direct LDL reflex    Encounter for screening for HIV        Relevant Orders    HIV 1/2 Antigen/Antibody (4th Generation) w Reflex [facility]    Skin lesion of back        Relevant Orders    Ambulatory referral to Dermatology    Ambulatory referral to Dermatology            Subjective:      Patient ID: Rainer Olivas is a 39 y o  male  HPI  Intermittent chest pain for a few years in the lower sternum radiating to the left upper chest wall  It lasts a few minutes and occurred months apart  Possibly related to stress at work and exercise  In the past week, it has been more frequent and happens with exertion and at rest  Recent severe episode with nausea  Also with SOB with exertion in the past 3 months and worse in the past 2 weeks  Admits to more stress recently  Diet has been poor in the past year and drinking more alcohol  He has cut down on this  Also experiencing heartburn and takes OTC Pepcid PRN    The following portions of the patient's history were reviewed and updated as appropriate: allergies, current medications, past family history, past medical history, past social history, past surgical history and problem list     Review of Systems   Constitutional: Positive for fatigue  Negative for fever and unexpected weight change  HENT: Negative for congestion, rhinorrhea and sore throat  Respiratory: Positive for shortness of breath  Negative for cough and wheezing  Cardiovascular: Positive for chest pain  Negative for palpitations and leg swelling  Gastrointestinal: Negative for abdominal pain, constipation, diarrhea, nausea and vomiting  Musculoskeletal: Negative for arthralgias and myalgias  Skin:        New growth on the mid back and known sebaceous cyst in the lower back   Neurological: Negative for dizziness and headaches  Objective:      /82   Pulse 101   Resp 16   Ht 5' 10" (1 778 m)   Wt 73 4 kg (161 lb 12 8 oz)   SpO2 99%   BMI 23 22 kg/m²          Physical Exam  Constitutional:       Appearance: He is well-developed  HENT:      Head: Normocephalic and atraumatic  Right Ear: External ear normal       Left Ear: External ear normal    Eyes:      Conjunctiva/sclera: Conjunctivae normal    Neck:      Musculoskeletal: Neck supple  Cardiovascular:      Rate and Rhythm: Normal rate and regular rhythm  Heart sounds: Normal heart sounds  No murmur  Pulmonary:      Effort: Pulmonary effort is normal  No respiratory distress  Breath sounds: Normal breath sounds  No wheezing or rales  Abdominal:      General: Bowel sounds are normal  There is no distension  Palpations: Abdomen is soft  There is no mass  Tenderness: There is no abdominal tenderness  There is no guarding or rebound  Musculoskeletal: Normal range of motion  Skin:     General: Skin is warm and dry  Comments: Pea sized dark brown pedunculated growth on the mid back  Sebaceous cyst on the lower back   Neurological:      Mental Status: He is alert and oriented to person, place, and time  Psychiatric:         Behavior: Behavior normal          Thought Content:  Thought content normal          Judgment: Judgment normal

## 2021-04-23 NOTE — PROGRESS NOTES
[facility]    NAME: Kirk Faith Race  AGE: 39 y o  SEX: male  : 1975     DATE: 2021     Assessment and Plan:     Problem List Items Addressed This Visit        Other    Heartburn      Other Visit Diagnoses     Annual physical exam    -  Primary    Chest pain, unspecified type        Relevant Orders    POCT ECG (Completed)    Laboratory exam ordered as part of routine general medical examination        Relevant Orders    CBC and differential    Comprehensive metabolic panel    Lipid Panel with Direct LDL reflex    Encounter for screening for HIV        Relevant Orders    HIV 1/2 Antigen/Antibody (4th Generation) w Reflex [facility]    Skin lesion of back        Relevant Orders    Ambulatory referral to Dermatology    Ambulatory referral to Dermatology          Immunizations and preventive care screenings were discussed with patient today  Appropriate education was printed on patient's after visit summary  Counseling:  · Start Pepcid OTC daily for at least 2 weeks  BMI Counseling: Body mass index is 23 22 kg/m²  The BMI is above normal  Nutrition recommendations include consuming healthier snacks  Exercise recommendations include exercising 3-5 times per week  Return in about 6 months (around 10/23/2021)  Chief Complaint:     Chief Complaint   Patient presents with    Annual Exam    Chest Pain     Center, worsening and more constant, feels "stretching" in chest      History of Present Illness:     Adult Annual Physical   Patient here for a comprehensive physical exam  The patient reports problems - chest pain SOB  Diet and Physical Activity  · Diet/Nutrition: poor diet  · Exercise: no formal exercise        Depression Screening  PHQ-9 Depression Screening    PHQ-9:   Frequency of the following problems over the past two weeks:      Little interest or pleasure in doing things: 0 - not at all  Feeling down, depressed, or hopeless: 0 - not at all  PHQ-2 Score: 0       General Health  · Sleep: sleeps well  · Hearing: normal - bilateral   · Vision: most recent eye exam >1 year ago and wears glasses  · Dental: no dental visits for >1 year   Health  · Symptoms include: none     Review of Systems:     Review of Systems   Constitutional: Positive for fatigue  Negative for chills, fever and unexpected weight change  HENT: Negative for congestion, rhinorrhea, sinus pressure and sore throat  Respiratory: Positive for shortness of breath  Negative for cough and wheezing  Cardiovascular: Positive for chest pain  Negative for palpitations and leg swelling  Gastrointestinal: Negative for abdominal pain, constipation, diarrhea, nausea and vomiting  Genitourinary: Negative for difficulty urinating  Musculoskeletal: Negative for arthralgias and myalgias  Skin:        Lesion on the back the is new and growing   Neurological: Negative for dizziness and headaches        Past Medical History:     Past Medical History:   Diagnosis Date    Screen for STD (sexually transmitted disease) 2/28/2018    Wellness examination 2/28/2018      Past Surgical History:     Past Surgical History:   Procedure Laterality Date    ACHILLES TENDON REPAIR Right       Family History:     Family History   Problem Relation Age of Onset    Colon cancer Mother     Lung cancer Father     Diabetes Father     Heart attack Maternal Grandfather     Lung cancer Paternal [de-identified]     Brain cancer Paternal Aunt       Social History:        Social History     Socioeconomic History    Marital status: /Civil Union     Spouse name: None    Number of children: None    Years of education: None    Highest education level: None   Occupational History    None   Social Needs    Financial resource strain: None    Food insecurity     Worry: None     Inability: None    Transportation needs     Medical: None     Non-medical: None   Tobacco Use    Smoking status: Never Smoker    Smokeless tobacco: Never Used   Substance and Sexual Activity    Alcohol use: Yes     Frequency: 4 or more times a week     Drinks per session: 1 or 2     Comment: 1-2 drinks a day    Drug use: No    Sexual activity: Not Currently   Lifestyle    Physical activity     Days per week: None     Minutes per session: None    Stress: None   Relationships    Social connections     Talks on phone: None     Gets together: None     Attends Jain service: None     Active member of club or organization: None     Attends meetings of clubs or organizations: None     Relationship status: None    Intimate partner violence     Fear of current or ex partner: None     Emotionally abused: None     Physically abused: None     Forced sexual activity: None   Other Topics Concern    None   Social History Narrative    ** Merged History Encounter **           Current Medications:     Current Outpatient Medications   Medication Sig Dispense Refill    ALPRAZolam (XANAX) 0 25 mg tablet Take by mouth      citalopram (CeleXA) 20 mg tablet TK 1 T PO  QAM  2    lamoTRIgine (LaMICtal) 150 MG tablet Take 150 mg by mouth daily at bedtime       No current facility-administered medications for this visit  Allergies: Allergies   Allergen Reactions    Azithromycin Other (See Comments)      Physical Exam:     /82   Pulse 101   Resp 16   Ht 5' 10" (1 778 m)   Wt 73 4 kg (161 lb 12 8 oz)   SpO2 99%   BMI 23 22 kg/m²     Physical Exam  Vitals signs and nursing note reviewed  Constitutional:       General: He is not in acute distress  Appearance: He is well-developed  He is not ill-appearing, toxic-appearing or diaphoretic  HENT:      Head: Normocephalic and atraumatic  Eyes:      Conjunctiva/sclera: Conjunctivae normal    Neck:      Musculoskeletal: Neck supple  Cardiovascular:      Rate and Rhythm: Normal rate and regular rhythm  Heart sounds: No murmur  Pulmonary:      Effort: Pulmonary effort is normal  No respiratory distress  Breath sounds: Normal breath sounds  Abdominal:      Palpations: Abdomen is soft  Tenderness: There is no abdominal tenderness  Skin:     General: Skin is warm and dry  Comments: Pea sized dark brown pedunculated growth on the mid back  Sebaceous cyst on the lower back   Neurological:      Mental Status: He is alert and oriented to person, place, and time  Psychiatric:         Mood and Affect: Mood normal  Mood is not anxious  Behavior: Behavior normal  Behavior is not agitated            Vu Pinzon MD  [facility] [address]

## 2021-04-23 NOTE — PATIENT INSTRUCTIONS

## 2021-05-11 LAB
ALBUMIN SERPL-MCNC: 4.1 G/DL (ref 3.6–5.1)
ALBUMIN/GLOB SERPL: 1.6 (CALC) (ref 1–2.5)
ALP SERPL-CCNC: 38 U/L (ref 36–130)
ALT SERPL-CCNC: 30 U/L (ref 9–46)
AST SERPL-CCNC: 22 U/L (ref 10–40)
BASOPHILS # BLD AUTO: 30 CELLS/UL (ref 0–200)
BASOPHILS NFR BLD AUTO: 0.7 %
BILIRUB SERPL-MCNC: 0.7 MG/DL (ref 0.2–1.2)
BUN SERPL-MCNC: 12 MG/DL (ref 7–25)
BUN/CREAT SERPL: NORMAL (CALC) (ref 6–22)
CALCIUM SERPL-MCNC: 9.4 MG/DL (ref 8.6–10.3)
CHLORIDE SERPL-SCNC: 103 MMOL/L (ref 98–110)
CHOLEST SERPL-MCNC: 221 MG/DL
CHOLEST/HDLC SERPL: 3.3 (CALC)
CO2 SERPL-SCNC: 28 MMOL/L (ref 20–32)
CREAT SERPL-MCNC: 1.14 MG/DL (ref 0.6–1.35)
EOSINOPHIL # BLD AUTO: 82 CELLS/UL (ref 15–500)
EOSINOPHIL NFR BLD AUTO: 1.9 %
ERYTHROCYTE [DISTWIDTH] IN BLOOD BY AUTOMATED COUNT: 12.5 % (ref 11–15)
GLOBULIN SER CALC-MCNC: 2.6 G/DL (CALC) (ref 1.9–3.7)
GLUCOSE SERPL-MCNC: 92 MG/DL (ref 65–99)
HCT VFR BLD AUTO: 42.9 % (ref 38.5–50)
HDLC SERPL-MCNC: 66 MG/DL
HGB BLD-MCNC: 14.7 G/DL (ref 13.2–17.1)
HIV 1+2 AB+HIV1 P24 AG SERPL QL IA: NORMAL
LDLC SERPL CALC-MCNC: 139 MG/DL (CALC)
LYMPHOCYTES # BLD AUTO: 1161 CELLS/UL (ref 850–3900)
LYMPHOCYTES NFR BLD AUTO: 27 %
MCH RBC QN AUTO: 32.4 PG (ref 27–33)
MCHC RBC AUTO-ENTMCNC: 34.3 G/DL (ref 32–36)
MCV RBC AUTO: 94.5 FL (ref 80–100)
MONOCYTES # BLD AUTO: 318 CELLS/UL (ref 200–950)
MONOCYTES NFR BLD AUTO: 7.4 %
NEUTROPHILS # BLD AUTO: 2709 CELLS/UL (ref 1500–7800)
NEUTROPHILS NFR BLD AUTO: 63 %
NONHDLC SERPL-MCNC: 155 MG/DL (CALC)
PLATELET # BLD AUTO: 227 THOUSAND/UL (ref 140–400)
PMV BLD REES-ECKER: 10 FL (ref 7.5–12.5)
POTASSIUM SERPL-SCNC: 4.3 MMOL/L (ref 3.5–5.3)
PROT SERPL-MCNC: 6.7 G/DL (ref 6.1–8.1)
RBC # BLD AUTO: 4.54 MILLION/UL (ref 4.2–5.8)
SL AMB EGFR AFRICAN AMERICAN: 91 ML/MIN/1.73M2
SL AMB EGFR NON AFRICAN AMERICAN: 78 ML/MIN/1.73M2
SODIUM SERPL-SCNC: 138 MMOL/L (ref 135–146)
TRIGL SERPL-MCNC: 66 MG/DL
WBC # BLD AUTO: 4.3 THOUSAND/UL (ref 3.8–10.8)

## 2021-06-07 ENCOUNTER — OFFICE VISIT (OUTPATIENT)
Dept: INTERNAL MEDICINE CLINIC | Facility: CLINIC | Age: 46
End: 2021-06-07
Payer: COMMERCIAL

## 2021-06-07 VITALS
HEART RATE: 80 BPM | DIASTOLIC BLOOD PRESSURE: 70 MMHG | SYSTOLIC BLOOD PRESSURE: 122 MMHG | WEIGHT: 164 LBS | HEIGHT: 68 IN | BODY MASS INDEX: 24.86 KG/M2 | OXYGEN SATURATION: 99 % | TEMPERATURE: 97.8 F

## 2021-06-07 DIAGNOSIS — B07.9 VIRAL WARTS, UNSPECIFIED TYPE: ICD-10-CM

## 2021-06-07 DIAGNOSIS — Z79.899 HIGH RISK MEDICATION USE: ICD-10-CM

## 2021-06-07 DIAGNOSIS — R20.2 PARESTHESIA: Primary | ICD-10-CM

## 2021-06-07 PROCEDURE — 1036F TOBACCO NON-USER: CPT | Performed by: INTERNAL MEDICINE

## 2021-06-07 PROCEDURE — 99214 OFFICE O/P EST MOD 30 MIN: CPT | Performed by: INTERNAL MEDICINE

## 2021-06-07 PROCEDURE — 3008F BODY MASS INDEX DOCD: CPT | Performed by: INTERNAL MEDICINE

## 2021-06-07 NOTE — PROGRESS NOTES
Assessment/Plan:  Non specific paresthesias  Obtain B12 level, lamictal level  Advised to start B complex  Strongly advised to abstain form alcohol  Try to make appt with neurology       Problem List Items Addressed This Visit     None      Visit Diagnoses     Paresthesia    -  Primary    Relevant Orders    Vitamin B12    Vitamin D 25 hydroxy    Ambulatory referral to Neurology    Viral warts, unspecified type        High risk medication use        Relevant Orders    Lamotrigine level            Subjective:      Patient ID: Ernst Abdi is a 42 y o  male  HPI  Chronic fatigue  Chronic weakness in his legs  Noticeable twitching in the muscles in the thighs  No falls due to weakness   8 months ago pain in the left antecubital area   Aggravated it again recently when he carried lugage  Pain in the left shoulder   Sometimes neck hurts with pain down the left arm  Fingers hurt  Recently seen for chest pain that improved with Pepcid  Drinking alcohol 2 a night a more on the weekends  He has lessened this recently    The following portions of the patient's history were reviewed and updated as appropriate: allergies, current medications, past family history, past medical history, past social history, past surgical history and problem list     Review of Systems   Constitutional: Positive for appetite change (less) and fatigue  Negative for chills, fever and unexpected weight change  HENT: Negative for congestion and rhinorrhea  Respiratory: Negative for cough and shortness of breath  Cardiovascular: Negative for chest pain (resolved with Pepcid) and palpitations  Gastrointestinal: Negative for abdominal pain, constipation and diarrhea  Genitourinary: Negative for difficulty urinating  Musculoskeletal: Positive for arthralgias and neck pain     Skin:        Growth left calf  Has an appt with dermatology in July for skin tag on his back   Neurological: Positive for weakness (legs) and headaches (sometimes occipital)  Negative for dizziness (occasional)  Objective:      /70   Pulse 80   Temp 97 8 °F (36 6 °C) (Temporal)   Ht 5' 8 25" (1 734 m)   Wt 74 4 kg (164 lb)   SpO2 99%   BMI 24 75 kg/m²          Physical Exam  Constitutional:       General: He is not in acute distress  Appearance: He is well-developed  He is not ill-appearing, toxic-appearing or diaphoretic  HENT:      Head: Normocephalic and atraumatic  Right Ear: External ear normal       Left Ear: External ear normal    Eyes:      Conjunctiva/sclera: Conjunctivae normal    Neck:      Musculoskeletal: Neck supple  Cardiovascular:      Rate and Rhythm: Normal rate and regular rhythm  Heart sounds: Normal heart sounds  No murmur  Pulmonary:      Effort: Pulmonary effort is normal  No respiratory distress  Breath sounds: Normal breath sounds  No wheezing or rales  Abdominal:      General: There is no distension  Palpations: Abdomen is soft  There is no mass  Tenderness: There is no abdominal tenderness  There is no guarding or rebound  Musculoskeletal:      Right lower leg: No edema  Left lower leg: No edema  Comments: 5/5 in all extremities  Fill ROM of the neck, shoulders  Neg SLR ++patellar reflexes b/l  Fine tremor in the left arm when arms outstretched   Skin:     General: Skin is warm and dry  Findings: Lesion (warty nodule left calf) present  Neurological:      Mental Status: He is alert and oriented to person, place, and time  Psychiatric:         Mood and Affect: Mood normal          Behavior: Behavior normal          Thought Content:  Thought content normal          Judgment: Judgment normal

## 2021-06-11 ENCOUNTER — TELEPHONE (OUTPATIENT)
Dept: NEUROLOGY | Facility: CLINIC | Age: 46
End: 2021-06-11

## 2021-06-11 NOTE — TELEPHONE ENCOUNTER
Best contact number for [id number]:[phone]    Emergency Contact name and number:None    Referring provider and telephone number:[facility] Provider Name and if affiliated with [facility]: Ian Breeden     Reason for Appointment/Dx:Paresthsia     Have you seen and followed up with a pediatric Neurologist for this disease in the past?  No      Neurology Location patient would like to be seen:[facility] received? Yes                                                 Records Received? No     Have you ever seen another Neurologist?       No    Insurance Information    Insurance Name:[employer]     ID/Policy #:    Secondary Insurance:    ID/Policy#: Workman's Comp/ Accident/ School  Information      Workman's Comp/Accident/School related?        None    If yes name of Insurance company:    Claim #:    Date of Injury:    Type of Injury:     Name and Telephone Number:    Notes:Appointment schedule with patient new patient pack sent                    Appointment date: 11- 8:00am with Dr Caprice Saldivar

## 2021-06-17 LAB
25(OH)D3 SERPL-MCNC: 31 NG/ML (ref 30–100)
LAMOTRIGINE SERPL-MCNC: 4.7 MCG/ML (ref 4–18)
VIT B12 SERPL-MCNC: 392 PG/ML (ref 200–1100)

## 2021-09-15 ENCOUNTER — NURSE TRIAGE (OUTPATIENT)
Dept: OTHER | Facility: OTHER | Age: 46
End: 2021-09-15

## 2021-09-15 DIAGNOSIS — Z20.822 SUSPECTED SEVERE ACUTE RESPIRATORY SYNDROME CORONAVIRUS 2 (SARS-COV-2) INFECTION: Primary | ICD-10-CM

## 2021-09-15 PROCEDURE — U0003 INFECTIOUS AGENT DETECTION BY NUCLEIC ACID (DNA OR RNA); SEVERE ACUTE RESPIRATORY SYNDROME CORONAVIRUS 2 (SARS-COV-2) (CORONAVIRUS DISEASE [COVID-19]), AMPLIFIED PROBE TECHNIQUE, MAKING USE OF HIGH THROUGHPUT TECHNOLOGIES AS DESCRIBED BY CMS-2020-01-R: HCPCS | Performed by: INTERNAL MEDICINE

## 2021-09-15 PROCEDURE — U0005 INFEC AGEN DETEC AMPLI PROBE: HCPCS | Performed by: INTERNAL MEDICINE

## 2021-09-15 NOTE — TELEPHONE ENCOUNTER
1  Were you within 6 feet or less, for up to 15 minutes or more with a person that has a confirmed COVID-19 test?   Denied  2  What was the date of your exposure? N/A  3  Are you experiencing any symptoms attributed to the virus?  (Assess for SOB, cough, fever, difficulty breathing)  Started about 2 days ago  Headache  Body stiffness  More tired than normal  Denied fever or cough  Intermittent feeling that he is getting flushed  4  HIGH RISK: Do you have any history heart or lung conditions, weakened immune system, diabetes, Asthma, CHF, HIV, COPD, Chemo, renal failure, sickle cell, etc?  Denied    Fully vaccinated with [facility] April 2021

## 2021-09-15 NOTE — TELEPHONE ENCOUNTER
[facility] [address] TEST REQUEST  ----- Message from Wayne Feldman sent at 9/15/2021 12:58 PM EDT -----  "I've had a headache for the past 3 days, feeling tired and sore  I was fully vaccinated since April " He is concern that he may need to be tested to be sure

## 2021-09-16 LAB — SARS-COV-2 RNA RESP QL NAA+PROBE: NEGATIVE

## 2022-01-31 ENCOUNTER — OFFICE VISIT (OUTPATIENT)
Dept: INTERNAL MEDICINE CLINIC | Facility: CLINIC | Age: 47
End: 2022-01-31
Payer: COMMERCIAL

## 2022-01-31 VITALS
SYSTOLIC BLOOD PRESSURE: 114 MMHG | TEMPERATURE: 98.8 F | WEIGHT: 173.6 LBS | RESPIRATION RATE: 16 BRPM | HEIGHT: 69 IN | DIASTOLIC BLOOD PRESSURE: 82 MMHG | HEART RATE: 86 BPM | BODY MASS INDEX: 25.71 KG/M2 | OXYGEN SATURATION: 99 %

## 2022-01-31 DIAGNOSIS — E55.9 VITAMIN D DEFICIENCY: ICD-10-CM

## 2022-01-31 DIAGNOSIS — E53.8 B12 DEFICIENCY: ICD-10-CM

## 2022-01-31 DIAGNOSIS — R53.82 CHRONIC FATIGUE: Primary | ICD-10-CM

## 2022-01-31 DIAGNOSIS — F41.9 ANXIETY DISORDER, UNSPECIFIED TYPE: ICD-10-CM

## 2022-01-31 PROBLEM — Z13.220 SCREENING, LIPID: Status: RESOLVED | Noted: 2018-02-28 | Resolved: 2022-01-31

## 2022-01-31 PROBLEM — Z13.1 SCREENING FOR DIABETES MELLITUS: Status: RESOLVED | Noted: 2018-02-28 | Resolved: 2022-01-31

## 2022-01-31 PROCEDURE — 99214 OFFICE O/P EST MOD 30 MIN: CPT | Performed by: INTERNAL MEDICINE

## 2022-01-31 NOTE — PROGRESS NOTES
Assessment/Plan:  Multiple nonspecific symptoms  Advised to take [place] to [address] IU weekly  Obtain labs before next visit  Check BP at home regularly goal 120/80     Problem List Items Addressed This Visit        Other    Vitamin D deficiency    Relevant Orders    Vitamin D 25 hydroxy    Anxiety disorder    Relevant Orders    Ambulatory Referral to [facility]      Other Visit Diagnoses     Chronic fatigue    -  Primary    Relevant Orders    CBC and differential    Comprehensive metabolic panel    TSH, 3rd generation with Free T4 reflex    B12 deficiency        Relevant Orders    Vitamin B12            Subjective:      Patient ID: Ernst Sara Race is a 42 y o  male  HPI  2 months of not feeling well  Headaches, blurry vision, lightheaded, fatigue, poor concentration  High BP at home 140-150/90s  He checks when he does not feel well  Diet is poor, sedentary  Under stress at work but symptoms do not feel like his typical anxiety  Episode of numbness on one side of the body the other day  Chronic nerve  pain in the arms  Fatigue in the past improved with vit D  He has not been taking it regulalry    The following portions of the patient's history were reviewed and updated as appropriate: allergies, current medications, past family history, past medical history, past social history, past surgical history and problem list     Review of Systems   Constitutional: Positive for fatigue  Negative for fever  Respiratory: Negative for cough and shortness of breath  Cardiovascular: Positive for chest pain (occasional releived by Pepcid) and palpitations  Gastrointestinal: Negative for abdominal pain  Musculoskeletal: Positive for arthralgias  Objective:      /82   Pulse 86   Temp 98 8 °F (37 1 °C)   Resp 16   Ht 5' 9" (1 753 m)   Wt 78 7 kg (173 lb 9 6 oz)   SpO2 99%   BMI 25 64 kg/m²          Physical Exam  Constitutional:       General: He is not in acute distress       Appearance: He is well-developed  He is not ill-appearing, toxic-appearing or diaphoretic  HENT:      Head: Normocephalic and atraumatic  Eyes:      Conjunctiva/sclera: Conjunctivae normal    Cardiovascular:      Rate and Rhythm: Normal rate and regular rhythm  Heart sounds: Normal heart sounds  No murmur heard  Pulmonary:      Effort: Pulmonary effort is normal  No respiratory distress  Breath sounds: Normal breath sounds  No wheezing or rales  Abdominal:      General: There is no distension  Palpations: Abdomen is soft  There is no mass  Tenderness: There is no abdominal tenderness  There is no guarding or rebound  Musculoskeletal:      Cervical back: Neck supple  Skin:     General: Skin is warm and dry  Neurological:      Mental Status: He is alert and oriented to person, place, and time  Psychiatric:         Mood and Affect: Mood normal          Behavior: Behavior normal          Thought Content:  Thought content normal          Judgment: Judgment normal

## 2022-02-22 ENCOUNTER — SOCIAL WORK (OUTPATIENT)
Dept: BEHAVIORAL/MENTAL HEALTH CLINIC | Facility: CLINIC | Age: 47
End: 2022-02-22
Payer: COMMERCIAL

## 2022-02-22 DIAGNOSIS — F41.9 ANXIETY DISORDER, UNSPECIFIED TYPE: Primary | ICD-10-CM

## 2022-02-22 PROCEDURE — 90791 PSYCH DIAGNOSTIC EVALUATION: CPT | Performed by: SOCIAL WORKER

## 2022-02-22 NOTE — Clinical Note
Unable to tolerate higher dose of Celexa due to brain zaps  Having continues issues with anxiety and depression  Can you make med recommendations?  Delicia Helms

## 2022-02-23 NOTE — PSYCH
Assessment/Plan:      Diagnoses and all orders for this visit:    Anxiety disorder, unspecified type          Subjective: Burak Mathew has had some long-standing issues with anxiety and OCD type thinking  Has been seeing Dr Sid Lobato in [place] but feels his mood has been worsening  Now he is struggling with depressive symptoms including decreased energy, motivation or enjoyment  Becoming less active and more isolative away from work  Patient ID: Thomas Jaffe is a 42 y o  male  HPI:     Pre-morbid level of function and History of Present Illness: more active and engaged socially and recreationally  Previous Psychiatric/psychological treatment/year: None  Current Psychiatrist/Therapist: Dr Sid Lobato- psychiatrist  Outpatient and/or Partial and Other Community Resources Used (CTT, ICM, VNA): None      Problem Assessment:     SOCIAL/VOCATION:  Family Constellation (include parents, relationship with each and pertinent Psych/Medical History):     Family History   Problem Relation Age of Onset    Colon cancer Mother     Lung cancer Father     Diabetes Father     Heart attack Maternal Grandfather     Lung cancer Paternal Aunt     Brain cancer Paternal Aunt        Mother: lives with mother  Spouse:   Father:   Children: has an adult son and pre-teen daughter who both strugggle with anxiety  Sibling: none- he is an only child    Burak Mathew relates best to children  he lives with mother  he does not live alone  Domestic Violence: No past history of domestic violence    Additional Comments related to family/relationships/peer support: Has good peer support/friends but is not reaching out or engaging in activity with them    School or Work History (strengths/limitations/needs): HS graduate from [employer]   Working full time in job he does not enjoy and is stressful    Her highest grade level achieved was 12     history includes none    Financial status includes no financial issues    LEISURE ASSESSMENT (Include past and present hobbies/interests and level of involvement (Ex: Group/Club Affiliations): play golf, go out with friends- has no been engaging in these activities  his primary language is [place]  Preferred language is [place]  Ethnic considerations are   Religions affiliations and level of involvement Anglican   Does spirituality help you cope? Yes     FUNCTIONAL STATUS: There has been a recent change in Gregor Land ability to do the following: does not need can service    Level of Assistance Needed/By Whom?: None    Gregor Land learns best by  all learning methods    SUBSTANCE ABUSE ASSESSMENT: current substance abuse     Substance/Route/Age/Amount/Frequency/Last Use: has been having several beers daily and more on weekends  Likely affecting his sleep  Possible self-medicating to manage stress and mood issues    DETOX HISTORY: None    Previous detox/rehab treatment: None    HEALTH ASSESSMENT: no referral to PCP needed    LEGAL: None    Prenatal History: uneventful pregnancy    Delivery History: N/A    Developmental Milestones: met all developmental milestones    Temperament as an infant was normal     Temperament as a toddler was normal   Temperament at school age was normal   Temperament as a teenager was normal     Risk Assessment:   The following ratings are based on my review of records    Risk of Harm to Self:   Demographic risk factors include , male and  status  Historical Risk Factors include None  Recent Specific Risk Factors include none  Additional Factors for a Child or Adolescent gender: male (more likely to succeed)    Risk of Harm to Others:   Demographic Risk Factors include male  Historical Risk Factors include none  Recent Specific Risk Factors include none    Access to Weapons:   Gregor Land has access to the following weapons: none   The following steps have been taken to ensure weapons are properly secured: N/A    Based on the above information, the client presents the following risk of harm to self or others:  low    The following interventions are recommended:   no intervention changes    Notes regarding this Risk Assessment: low        Review Of Systems:     Mood Anxiety and Depression   Behavior Normal    Thought Content Normal   General Normal    Personality Normal   Other Psych Symptoms Normal   Constitutional Normal   ENT Normal   Cardiovascular Normal    Respiratory Normal    Gastrointestinal Normal   Genitourinary Normal    Musculoskeletal Negative   Integumentary Normal    Neurological Normal    Endocrine Normal          Mental status:  Appearance calm and cooperative  and good eye contact    Mood depressed and anxious   Affect affect appropriate    Speech a normal rate and fluent   Thought Processes normal thought processes   Hallucinations no hallucinations present    Thought Content no delusions   Abnormal Thoughts no suicidal thoughts  and no homicidal thoughts    Orientation  oriented to person, oriented to place and oriented to time   Remote Memory short term memory intact and long term memory intact   Attention Span concentration intact   Intellect Appears to be of Average Intelligence   Fund of Knowledge displays adequate knowledge of current events, adequate fund of knowledge regarding past history and adequate fund of knowledge regarding vocabulary    Insight Insight intact   Judgement judgment was intact   Muscle Strength Normal gait    Language no difficulty naming common objects, no difficulty repeating a phrase  and no difficulty writing a sentence    Pain none   Pain Scale 0

## 2022-02-23 NOTE — PATIENT INSTRUCTIONS
Processed stressors with his children, living with mother and with work- validation and supportive therapy provided  Reviewed likelihood that he has been using alcohol due to his struggles with anxiety, depression and sleep  Has worked to reduce usage  Was unable to tolerate higher dose of Celexa due to "brain zaps"  Still happen occasionally on lower dose but manageable  Still having issues with anxiety and now having increased struggles with decreased energy, motivation and enjoyment  Becoming more inactive and isolative away from work  Will consult with psychiatry regarding possible med options and consider possible transfer to [place] [id number]

## 2022-03-10 ENCOUNTER — TELEPHONE (OUTPATIENT)
Dept: NEUROLOGY | Facility: CLINIC | Age: 47
End: 2022-03-10

## 2022-03-10 NOTE — TELEPHONE ENCOUNTER
Called to offer a sooner appointment  Left a voicemail  Offered 3/10/22 @ 3pm w /Dr Marley Sommer in [facility]  Advised all availability is subject to change and we will be trying to fill the spot  Advised patient to callback to ASAP

## 2022-05-03 ENCOUNTER — CONSULT (OUTPATIENT)
Dept: NEUROLOGY | Facility: CLINIC | Age: 47
End: 2022-05-03
Payer: COMMERCIAL

## 2022-05-03 VITALS
WEIGHT: 168.4 LBS | HEART RATE: 83 BPM | DIASTOLIC BLOOD PRESSURE: 80 MMHG | BODY MASS INDEX: 24.94 KG/M2 | HEIGHT: 69 IN | TEMPERATURE: 97.6 F | SYSTOLIC BLOOD PRESSURE: 135 MMHG

## 2022-05-03 DIAGNOSIS — R41.3 MEMORY CHANGE: ICD-10-CM

## 2022-05-03 DIAGNOSIS — R20.2 PARESTHESIA: Primary | ICD-10-CM

## 2022-05-03 PROCEDURE — 99204 OFFICE O/P NEW MOD 45 MIN: CPT | Performed by: PSYCHIATRY & NEUROLOGY

## 2022-05-03 PROCEDURE — 1036F TOBACCO NON-USER: CPT | Performed by: PSYCHIATRY & NEUROLOGY

## 2022-05-03 PROCEDURE — 3008F BODY MASS INDEX DOCD: CPT | Performed by: PSYCHIATRY & NEUROLOGY

## 2022-05-03 NOTE — PROGRESS NOTES
Patient ID: Bhargavi Torres is a 42 y o  male  Assessment/Plan:    Paresthesia  Karlee Abdi is a 42 y o  male w/ PMH anxiety/depression who presents w/ fatigue, memory changes and BUE/BLE paresthesias vs weakness   Limited PMH, FH   Some blurry vision intermittently w/ or w/o headaches   BLE shaking/weakness    Plan:   Obtain labs: CK, TSH, B12/MMA, CMP, CBC, JUSTO, vitamin D, SPEP, B6, hgb A1c   Patient to implement conservative measures, increase physical, mental, social activity   F/u 3-4 mo    Memory change  Patient endorses STM/LTM changes and attentional/cognitive changes that are non-specific  Work has not suffered per se but things do take him longer to articulate, etc     Plan:   Workup as above   Advised of sleep hygiene and importance of good diet   Did discuss effect of EtOH on memory and sensation/strength     Diagnoses and all orders for this visit:    Paresthesia  -     Vitamin B12; Future  -     Methylmalonic acid, serum; Future  -     Vitamin B6; Future  -     VITAMIN D, 25 HYDROXY, TOTAL; Future  -     Protein electrophoresis, serum; Future  -     HEMOGLOBIN A1C W/ EAG ESTIMATION; Future  -     Comprehensive metabolic panel; Future  -     CBC and differential; Future  -     TSH, 3rd generation with Free T4 reflex; Future  -     Ambulatory referral to Neurology  -     CK (with reflex to MB); Future  -     JUSTO w/Reflex; Future    Memory change  -     Vitamin B12; Future  -     Methylmalonic acid, serum; Future  -     Comprehensive metabolic panel; Future  -     TSH, 3rd generation with Free T4 reflex; Future       Subjective:    Karlee Abdi is a 42 y o  male w/ PMH anxiety/depression who presents for multiple nonspecific neurologic complaints  Patient reports that he has had fatigue and weakness that has been ongoing for 2-3 years  His sx started with fatigue and weakness (BLE shaking during/after exertion)  Now has poor memory STM > LTM   Also has headaches with blurry vision, and sometimes has blurry vision w/o headaches  These last for an hour to couple of hours  Both eyes about the same  Blurry right in the middle, not double vision  Headaches throbbing on right superior aspect vs bilaterally w/ fullness in ears, 1-2x/wk  Starting about a year ago has intermittent "nerve pain" in right shoulder for a few days at a time  Described as a stabbing pain when it was happening on the right  Now happening in the neck and shooting down to the left arm but duller on the left  Intermittent  More difficulty with coordination and pain than weakness per se but arms do get tired when washing hair and brushing teeth  In BLE gets shaky weakness mostly after exertion or sometimes early in the morning on waking  Sometimes they get weak but limited sensory changes  Cold weather makes most of his sx worse  About 4-5 years ago patient reports gradual loss of taste and smells but no freezing or bradykinesia though he does sometimes feel like his gait is "off" but no one has ever noted an observable change  Did discuss wide differential but largely reassurance given the nonspecific symptoms and lack of clear sensorimotor deficits  This is not consistent with MS, ALS, PD, etc  Discussed conservative measures that can help with cognition and strength including increasing activity, improving diet, decreasing EtOH  Patient otherwise denies F/C, abdominal pain, dizziness, bowel or bladder dysfunction  Additional information:  Coffee - 2 cups per day  Beer - 2-4 per night during weekdays, 6-8 on weekends  Diet - poor, snacks, then dinner  Weight loss?  no  Lamotrigine? From psychiatrist - likely for anxiety/depression  Sleep? Sleep is ok, about 6-8 hrs but interrupted, snoring, never evaluated  Sometimes intermittent nausea for a couple of hours  Smoker?  No  FH: cousin possibly had movement disorder, he's in wheelchair; diabetes and heart disease; dad lung cancer in 62s and mom colon cancer in 76s    The following portions of the patient's history were reviewed and updated as appropriate: He  has a past medical history of Screen for STD (sexually transmitted disease) (2/28/2018), Screening for diabetes mellitus (2/28/2018), Screening, lipid (2/28/2018), and Wellness examination (2/28/2018)  He   Patient Active Problem List    Diagnosis Date Noted    Paresthesia 05/03/2022    Memory change 05/03/2022    Heartburn 02/28/2018    Sebaceous cyst 02/28/2018    Vitamin D deficiency 02/29/2016    Anxiety disorder 10/24/2014    Epidermoid cyst of skin 10/24/2014     He  has a past surgical history that includes Achilles tendon repair (Right)  His family history includes Brain cancer in his paternal aunt; Colon cancer in his mother; Diabetes in his father; Heart attack in his maternal grandfather; Lung cancer in his father and paternal aunt  He  reports that he has never smoked  He has never used smokeless tobacco  He reports current alcohol use  He reports that he does not use drugs  Current Outpatient Medications   Medication Sig Dispense Refill    ALPRAZolam (XANAX) 0 25 mg tablet Take by mouth daily as needed        citalopram (CeleXA) 20 mg tablet TK 1 T PO  QAM  2    lamoTRIgine (LaMICtal) 150 MG tablet Take 150 mg by mouth daily at bedtime       No current facility-administered medications for this visit  Current Outpatient Medications on File Prior to Visit   Medication Sig    ALPRAZolam (XANAX) 0 25 mg tablet Take by mouth daily as needed      citalopram (CeleXA) 20 mg tablet TK 1 T PO  QAM    lamoTRIgine (LaMICtal) 150 MG tablet Take 150 mg by mouth daily at bedtime     No current facility-administered medications on file prior to visit  He is allergic to azithromycin       Objective: Blood pressure 135/80, pulse 83, temperature 97 6 °F (36 4 °C), temperature source Temporal, height 5' 9" (1 753 m), weight 76 4 kg (168 lb 6 4 oz)  Physical Exam  Vitals reviewed     Constitutional:    Not in acute distress  Normal appearance  Not ill-appearing, toxic-appearing or diaphoretic  HENT:   Normocephalic and atraumatic  External ear normal b/l  Nose normal  No congestion or rhinorrhea  Mucous membranes are moist   Oropharynx is clear  No oropharyngeal exudate or posterior oropharyngeal erythema  Eyes:    No scleral icterus  No discharge b/l  Conjunctivae normal    Pulmonary:  Pulmonary effort is normal  No respiratory distress  GI: abdomen not distended  Musculoskeletal: no gross deformities  Skin:   Skin is not pale  Psychiatric:       Mood normal  Affect congruent    Neurological Exam  Mental Status   Alert and oriented to person, place, and time  Attention is normal  Speech is normal w/ naming and repetition intact and no dysarthria    Cranial Nerves   CN II Visual fields full to confrontation  CN III, IV, VI PERRL, brisk reactivity and consensual response intact b/l  EOMI w/o CN VI or III palsy  No clear nystagmus  CN V, VII Facial sensation and expression full, symmetric  CN VIII Hearing grossly symmetric  CN IX, X Palate symmetric  CN XI trapezius strength symmetric  CN XII no dysarthria, symmetric lingual protrusion     Motor Exam   Muscle bulk and tone grossly normal  Pronator drift absent b/l  Strength: R/L        Deltoid:    5/5      Biceps:    5/5    Triceps:   5/5     Wrist flexion:  5/5     Wrist extension: 5/5    Iliopsoas: 5/5      Quads: 5/5      Hamstrin/5     AT:  5/5      Gastroc: 5/5        Sensory Exam   Light touch, vibration, temperature intact and symmetric     Gait, Coordination, and Reflexes   FTN normal  No tremor observed  Reflexes: R/L  Brachioradialis: 2+/2+  Biceps:  2+/2+  Pateller: 3+/3+  Achilles: 2+/2+  Plantar: downgoing/downgoing    ROS:    Review of Systems   Constitutional: Negative for fever  HENT: Negative  Negative for hearing loss, tinnitus, trouble swallowing and voice change  Eyes: Negative  Negative for photophobia and pain     Respiratory: Negative  Negative for shortness of breath  Cardiovascular: Negative  Negative for palpitations  Gastrointestinal: Negative  Negative for nausea and vomiting  Endocrine: Negative  Negative for cold intolerance  Genitourinary: Negative  Negative for dysuria, frequency and urgency  Musculoskeletal: Negative  Negative for myalgias and neck pain  Skin: Negative  Negative for rash  Neurological: Positive for dizziness, weakness, light-headedness and headaches  Negative for tremors, seizures, syncope, facial asymmetry, speech difficulty (gets words mixed up ) and numbness  Hematological: Negative  Does not bruise/bleed easily  Psychiatric/Behavioral: Negative  Negative for confusion, hallucinations and sleep disturbance

## 2022-05-03 NOTE — ASSESSMENT & PLAN NOTE
Patient endorses STM/LTM changes and attentional/cognitive changes that are non-specific   Work has not suffered per se but things do take him longer to articulate, etc     Plan:   Workup as above   Advised of sleep hygiene and importance of good diet   Did discuss effect of EtOH on memory and sensation/strength

## 2022-05-03 NOTE — ASSESSMENT & PLAN NOTE
Maryanne [employer] Race is a 42 y o  male w/ PMH anxiety/depression who presents w/ fatigue, memory changes and BUE/BLE paresthesias vs weakness     Limited PMH, FH   Some blurry vision intermittently w/ or w/o headaches   BLE shaking/weakness    Plan:   Obtain labs: CK, TSH, B12/MMA, CMP, CBC, JUSTO, vitamin D, SPEP, B6, hgb A1c   Patient to implement conservative measures, increase physical, mental, social activity   F/u 3-4 mo

## 2022-05-13 LAB — HBA1C MFR BLD HPLC: 4.7 %

## 2022-05-20 ENCOUNTER — TELEPHONE (OUTPATIENT)
Dept: NEUROLOGY | Facility: CLINIC | Age: 47
End: 2022-05-20

## 2022-05-20 NOTE — TELEPHONE ENCOUNTER
----- Message from Alan Roberts RN sent at 5/19/2022  4:53 PM EDT -----    ----- Message -----  From: Michelle Barnes MD  Sent: 5/19/2022   4:03 PM EDT  To: [facility] [place] [facility]    Please let patient know that his hgb a1c was normal indicating that he does not have diabetes

## 2022-05-20 NOTE — TELEPHONE ENCOUNTER
----- Message from Jayy Gomez RN sent at 5/19/2022  4:53 PM EDT -----    ----- Message -----  From: Braeden Brunson MD  Sent: 5/19/2022   4:03 PM EDT  To: Neu[facility] [place] [facility]    Please let patient know that his hgb a1c was normal indicating that he does not have diabetes

## 2022-06-23 ENCOUNTER — TELEPHONE (OUTPATIENT)
Dept: INTERNAL MEDICINE CLINIC | Facility: CLINIC | Age: 47
End: 2022-06-23

## 2022-06-23 ENCOUNTER — TELEMEDICINE (OUTPATIENT)
Dept: INTERNAL MEDICINE CLINIC | Facility: CLINIC | Age: 47
End: 2022-06-23
Payer: COMMERCIAL

## 2022-06-23 DIAGNOSIS — U07.1 COVID-19 VIRUS INFECTION: Primary | ICD-10-CM

## 2022-06-23 PROCEDURE — 1036F TOBACCO NON-USER: CPT | Performed by: INTERNAL MEDICINE

## 2022-06-23 PROCEDURE — 99214 OFFICE O/P EST MOD 30 MIN: CPT | Performed by: INTERNAL MEDICINE

## 2022-06-23 NOTE — PROGRESS NOTES
COVID-19 Outpatient Progress Note    Assessment/Plan:    Problem List Items Addressed This Visit        Other    COVID-19 virus infection - Primary     Discussed possible treatment with Paxlovid  Due to patient's lack of risk factors, we decided not to do the Paxlovid  Pt is vaccinated and boosted, non-smoker, not obese  Pt can take Vitamin D, Vitamin C, and Zinc OTC  Pt instructed to reach out if developing any SOB  Disposition:     I have spent 20 minutes directly with the patient  Greater than 50% of this time was spent in counseling/coordination of care regarding: instructions for management, patient and family education, importance of treatment compliance and risk factor reductions  Encounter provider Shayla Sinclair MD    Provider located at [address] [place] [id number]    Recent Visits  No visits were found meeting these conditions  Showing recent visits within past 7 days and meeting all other requirements  Today's Visits  Date Type Provider Dept   06/23/22 Telemedicine Shayla Sinclair, [facility]   06/23/22 Telephone Grecia Bailey MD [address] [facility] today's visits and meeting all other requirements  Future Appointments  No visits were found meeting these conditions  Showing future appointments within next 150 days and meeting all other requirements       Patient agrees to participate in a virtual check in via telephone or video visit instead of presenting to the office to address urgent/immediate medical needs  Patient is aware this is a billable service  After connecting through [facility], the patient was identified by name and date of birth  Ghazal Abdi was informed that this was a telemedicine visit and that the exam was being conducted confidentially over secure lines  My office door was closed  No one else was in the room   Ghazal Abdi acknowledged consent and understanding of privacy and security of the telemedicine visit  I informed the patient that I have reviewed his record in Epic and presented the opportunity for him to ask any questions regarding the visit today  The patient agreed to participate  Verification of patient location:  Patient is located in the following state in which I hold an active license: [place]    Subjective:   Malcom Bose is a 41 y o  male who is concerned about COVID-19  Patient's symptoms include fever, rhinorrhea, sore throat, nausea, myalgias and headache  Patient denies chills, cough, shortness of breath and chest tightness      - Date of symptom onset: 6/22/2022      COVID-19 vaccination status: Fully vaccinated with booster    Lab Results   Component Value Date    SARSCOV2 Negative 09/15/2021    SARSCOV2 Not Detected 11/30/2020     Past Medical History:   Diagnosis Date    Screen for STD (sexually transmitted disease) 2/28/2018    Screening for diabetes mellitus 2/28/2018    Screening, lipid 2/28/2018    Wellness examination 2/28/2018     Past Surgical History:   Procedure Laterality Date    ACHILLES TENDON REPAIR Right      Current Outpatient Medications   Medication Sig Dispense Refill    ALPRAZolam (XANAX) 0 25 mg tablet Take by mouth daily as needed        citalopram (CeleXA) 20 mg tablet TK 1 T PO  QAM  2    lamoTRIgine (LaMICtal) 150 MG tablet Take 150 mg by mouth daily at bedtime       No current facility-administered medications for this visit  Allergies   Allergen Reactions    Azithromycin Other (See Comments)       Review of Systems   Constitutional: Positive for fever  Negative for chills  HENT: Positive for rhinorrhea and sore throat  Respiratory: Negative for cough, chest tightness and shortness of breath  Gastrointestinal: Positive for nausea  Musculoskeletal: Positive for myalgias  Neurological: Positive for headaches  Objective: There were no vitals filed for this visit      Physical Exam  Constitutional:       General: He is not in acute distress  Appearance: Normal appearance  Pulmonary:      Effort: Pulmonary effort is normal  No respiratory distress  Skin:     Coloration: Skin is not jaundiced or pale  Neurological:      Mental Status: He is alert  VIRTUAL VISIT DISCLAIMER    Rodríguez Tulio Abdi verbally agrees to participate in [employer]  Pt is aware that [employer] could be limited without vital signs or the ability to perform a full hands-on physical exam  Akhil Race understands he or the provider may request at any time to terminate the video visit and request the patient to seek care or treatment in person

## 2022-06-23 NOTE — ASSESSMENT & PLAN NOTE
Discussed possible treatment with Paxlovid  Due to patient's lack of risk factors, we decided not to do the Paxlovid  Pt is vaccinated and boosted, non-smoker, not obese  Pt can take Vitamin D, Vitamin C, and Zinc OTC  Pt instructed to reach out if developing any SOB

## 2022-06-23 NOTE — TELEPHONE ENCOUNTER
Ok to wait till tomorrow  Dr Rola Momin can discuss use of antiviral medicine if his symptom onset is within 5 days

## 2022-06-23 NOTE — TELEPHONE ENCOUNTER
The patient is covid +  He took a covid home test  These are his symptoms  Fever 99  Sweats  Feels hot  Sore throat  Headache  Sinus pain  Congestion  Nausea    There is no shortness of breath  He is scheduled to see Dr Keo Elizabeth tomorrow at 11:30  Should he be seen sooner? Please advise  Thank you

## 2022-07-15 ENCOUNTER — OFFICE VISIT (OUTPATIENT)
Dept: INTERNAL MEDICINE CLINIC | Facility: CLINIC | Age: 47
End: 2022-07-15
Payer: COMMERCIAL

## 2022-07-15 VITALS
TEMPERATURE: 97.4 F | OXYGEN SATURATION: 98 % | WEIGHT: 167.6 LBS | DIASTOLIC BLOOD PRESSURE: 82 MMHG | BODY MASS INDEX: 24.82 KG/M2 | HEIGHT: 69 IN | HEART RATE: 78 BPM | RESPIRATION RATE: 16 BRPM | SYSTOLIC BLOOD PRESSURE: 130 MMHG

## 2022-07-15 DIAGNOSIS — F41.1 GENERALIZED ANXIETY DISORDER: ICD-10-CM

## 2022-07-15 DIAGNOSIS — Z11.4 ENCOUNTER FOR SCREENING FOR HUMAN IMMUNODEFICIENCY VIRUS (HIV): ICD-10-CM

## 2022-07-15 DIAGNOSIS — Z00.00 ANNUAL PHYSICAL EXAM: Primary | ICD-10-CM

## 2022-07-15 DIAGNOSIS — Z13.220 SCREENING, LIPID: ICD-10-CM

## 2022-07-15 DIAGNOSIS — Z23 ENCOUNTER FOR IMMUNIZATION: ICD-10-CM

## 2022-07-15 DIAGNOSIS — R06.83 SNORING: ICD-10-CM

## 2022-07-15 DIAGNOSIS — H53.8 BLURRY VISION, BILATERAL: ICD-10-CM

## 2022-07-15 DIAGNOSIS — Z12.11 SCREEN FOR COLON CANCER: ICD-10-CM

## 2022-07-15 PROBLEM — U07.1 COVID-19 VIRUS INFECTION: Status: RESOLVED | Noted: 2022-06-23 | Resolved: 2022-07-15

## 2022-07-15 PROCEDURE — 3725F SCREEN DEPRESSION PERFORMED: CPT | Performed by: INTERNAL MEDICINE

## 2022-07-15 PROCEDURE — 99396 PREV VISIT EST AGE 40-64: CPT | Performed by: INTERNAL MEDICINE

## 2022-07-15 PROCEDURE — 90715 TDAP VACCINE 7 YRS/> IM: CPT

## 2022-07-15 PROCEDURE — 90471 IMMUNIZATION ADMIN: CPT

## 2022-07-15 NOTE — PATIENT INSTRUCTIONS

## 2022-07-15 NOTE — PROGRESS NOTES
[facility] [place] [facility]    NAME: Ernst Ruiz Race  AGE: 41 y o  SEX: male  : 1975     DATE: 2022     Assessment and Plan:     Problem List Items Addressed This Visit        Other    Anxiety disorder     Continue f/u with psych  He is interested in weaning off his meds and advised that he should discuss this with psych           Other Visit Diagnoses     Annual physical exam    -  Primary    Encounter for immunization        Relevant Orders    TDAP VACCINE GREATER THAN OR EQUAL TO 8YO IM (Completed)    Screen for colon cancer        Relevant Orders    Ambulatory referral for colonoscopy    Blurry vision, bilateral        Relevant Orders    Ambulatory Referral to Ophthalmology    Snoring        Relevant Orders    Home Study    Encounter for screening for human immunodeficiency virus (HIV)        Relevant Orders    HIV 1/2 Antigen/Antibody (4th Generation) w Reflex [facility]    Screening, lipid        Relevant Orders    Lipid Panel with Direct LDL reflex          Immunizations and preventive care screenings were discussed with patient today  Appropriate education was printed on patient's after visit summary  Counseling:  Exercise: the importance of regular exercise/physical activity was discussed  Recommend exercise 3-5 times per week for at least 30 minutes  Return in about 1 year (around 7/15/2023)  Chief Complaint:     Chief Complaint   Patient presents with    Physical Exam      History of Present Illness:     Adult Annual Physical   Patient here for a comprehensive physical exam  The patient reports recent COVID anxiety and depression  Diet and Physical Activity  Diet/Nutrition: poor diet  Exercise: no formal exercise        Depression Screening  PHQ-2/9 Depression Screening    Little interest or pleasure in doing things: 1 - several days  Feeling down, depressed, or hopeless: 1 - several days  PHQ-2 Score: 2  PHQ-2 Interpretation: Negative depression screen       General Health  Sleep: sleeps poorly  Hearing: normal - bilateral   Vision: goes for regular eye exams  Dental: regular dental visits   Health  Symptoms include: none     Review of Systems:     Review of Systems   Constitutional: Positive for appetite change, fatigue and unexpected weight change (weight loss)  Negative for fever  HENT: Positive for sinus pressure  Negative for hearing loss  Eyes: Positive for visual disturbance  Respiratory: Negative for shortness of breath  Cardiovascular: Negative for chest pain and palpitations  Gastrointestinal: Positive for abdominal pain (sharp stabbing pain occasional)  Negative for constipation, diarrhea, nausea and vomiting  Genitourinary: Negative for difficulty urinating  Musculoskeletal: Negative for arthralgias and myalgias  Neurological: Negative for dizziness and headaches  Psychiatric/Behavioral: Positive for dysphoric mood  The patient is nervous/anxious         Past Medical History:     Past Medical History:   Diagnosis Date    COVID-19 virus infection 6/23/2022    Screen for STD (sexually transmitted disease) 2/28/2018    Screening for diabetes mellitus 2/28/2018    Screening, lipid 2/28/2018    Wellness examination 2/28/2018      Past Surgical History:     Past Surgical History:   Procedure Laterality Date    ACHILLES TENDON REPAIR Right       Family History:     Family History   Problem Relation Age of Onset    Colon cancer Mother     Lung cancer Father     Diabetes Father     Heart attack Maternal Grandfather     Lung cancer Paternal [de-identified]     Brain cancer Paternal Aunt       Social History:     Social History     Socioeconomic History    Marital status: /Civil Union     Spouse name: None    Number of children: None    Years of education: None    Highest education level: None   Occupational History    None   Tobacco Use    Smoking status: Never Smoker    Smokeless tobacco: Never Used   Vaping Use    Vaping Use: Never used   Substance and Sexual Activity    Alcohol use: Yes     Alcohol/week: 15 0 standard drinks     Types: 15 Standard drinks or equivalent per week    Drug use: No    Sexual activity: Not Currently   Other Topics Concern    None   Social History Narrative    ** Merged History Encounter **          Social Determinants of Health     Financial Resource Strain: Not on file   Food Insecurity: Not on file   Transportation Needs: Not on file   Physical Activity: Not on file   Stress: Not on file   Social Connections: Not on file   Intimate Partner Violence: Not on file   Housing Stability: Not on file      Current Medications:     Current Outpatient Medications   Medication Sig Dispense Refill    ALPRAZolam (XANAX) 0 25 mg tablet Take by mouth daily as needed        citalopram (CeleXA) 20 mg tablet TK 1 T PO  QAM  2    lamoTRIgine (LaMICtal) 150 MG tablet Take 150 mg by mouth daily at bedtime       No current facility-administered medications for this visit  Allergies: Allergies   Allergen Reactions    Azithromycin Other (See Comments)      Physical Exam:     /82   Pulse 78   Temp (!) 97 4 °F (36 3 °C)   Resp 16   Ht 5' 9" (1 753 m)   Wt 76 kg (167 lb 9 6 oz)   SpO2 98%   BMI 24 75 kg/m²     Physical Exam  Vitals and nursing note reviewed  Constitutional:       General: He is not in acute distress  Appearance: He is well-developed  He is not ill-appearing, toxic-appearing or diaphoretic  HENT:      Head: Normocephalic and atraumatic  Right Ear: External ear normal  There is no impacted cerumen  Left Ear: External ear normal  There is no impacted cerumen  Eyes:      Conjunctiva/sclera: Conjunctivae normal    Cardiovascular:      Rate and Rhythm: Normal rate and regular rhythm  Heart sounds: No murmur heard  Pulmonary:      Effort: Pulmonary effort is normal  No respiratory distress        Breath sounds: Normal breath sounds  Abdominal:      Palpations: Abdomen is soft  Tenderness: There is no abdominal tenderness  Musculoskeletal:      Cervical back: Neck supple  Right lower leg: No edema  Left lower leg: No edema  Skin:     General: Skin is warm and dry  Neurological:      Mental Status: He is alert     Psychiatric:         Mood and Affect: Mood normal          Behavior: Behavior normal           Kallie Engel MD  [facility]

## 2022-07-18 NOTE — ASSESSMENT & PLAN NOTE
Continue f/u with psych   He is interested in weaning off his meds and advised that he should discuss this with psych

## 2022-11-08 ENCOUNTER — TELEPHONE (OUTPATIENT)
Dept: NEUROLOGY | Facility: CLINIC | Age: 47
End: 2022-11-08

## 2023-01-10 ENCOUNTER — TELEPHONE (OUTPATIENT)
Dept: NEUROLOGY | Facility: CLINIC | Age: 48
End: 2023-01-10

## 2023-01-10 NOTE — TELEPHONE ENCOUNTER
Attempted to reach pt to confirm appt with [facility]   Instructed pt to call office back to confirm appt

## 2023-01-17 ENCOUNTER — OFFICE VISIT (OUTPATIENT)
Dept: NEUROLOGY | Facility: CLINIC | Age: 48
End: 2023-01-17

## 2023-01-17 VITALS
BODY MASS INDEX: 26.33 KG/M2 | DIASTOLIC BLOOD PRESSURE: 94 MMHG | SYSTOLIC BLOOD PRESSURE: 137 MMHG | HEIGHT: 69 IN | HEART RATE: 73 BPM | TEMPERATURE: 98.3 F | WEIGHT: 177.8 LBS

## 2023-01-17 DIAGNOSIS — R06.83 SNORING: ICD-10-CM

## 2023-01-17 DIAGNOSIS — R51.9 NEW ONSET HEADACHE: ICD-10-CM

## 2023-01-17 DIAGNOSIS — R77.8 ABNORMAL SPEP: Primary | ICD-10-CM

## 2023-01-17 DIAGNOSIS — R41.3 MEMORY CHANGE: ICD-10-CM

## 2023-01-17 RX ORDER — MULTIVIT-MIN/IRON/FOLIC ACID/K 18-600-40
2000 CAPSULE ORAL DAILY
COMMUNITY

## 2023-01-17 NOTE — PROGRESS NOTES
Patient ID: Luisa Dove is a 41 y o  male  Assessment/Plan:    Memory change  Patient notes ongoing cognitive complaints, difficulty with concentrating/attention, short term and long term memory changes  He mainly notices issues at work where he has to continually multitask  Work up in the past unrevealing  He does have history of anxiety which we did discuss can contribute, he does feel is better controlled then previous  He does report poor sleep, snoring, some daytime somelence so will refer to sleep medicine to evaluate for sleep apnea  Did discuss sleep hygiene strategies that may assist with his sleep  Patient was encouraged to increase mind stimulating activities such as reading, crosswords, word searches, puzzles, Soduku, solitaire, coloring and other brain games  We also discussed the importance of staying physically active and eating a health diet such as the Mediterranean or MIND diet  New onset headache  Reports new headaches that started in the  past year-stabbing pain at the top of his head that can last for days, occurs every few weeks  He does not dizziness and vision changes with headaches-can obtain brain MRI due to new onset  He will trial ibuprofen 600 mg at onset of headache  Recommend starting vitamin b2 200 mg daily-and magnesium oxide 400-500 mg daily  If no improvement can consider alternative options  Diagnoses and all orders for this visit:    Abnormal SPEP  -     Protein electrophoresis, serum; Future    Memory change    New onset headache  -     MRI brain without contrast; Future    Snoring  -     Ambulatory Referral to Sleep Medicine; Future    Other orders  -     Cholecalciferol (Vitamin D) 50 MCG (2000 UT) CAPS; Take 2,000 Units by mouth in the morning           Subjective:    HPI    Luisa Dove is a 41 y o  male w/ PMH anxiety/depression who presents for follow up       Last office visit 5/2022 in which he was seen for complaints of poor memory, headaches, weakness and nerve pain in the arms  He was to obtain labs  Interval History:  Weakness, numbness tingling seems to have improved since last visit  He continues with some mild pain radiating from the left side of his neck down his arm that is intermittent-less bothersome  He continues to feel muscle fatigue at times to varying degrees, he is very inactive  He has no trouble going up and down the stairs, in and out of a car, washing his hair  He has had a hard time focusing on conversations, forgetting what he was talking about  He is forgetting how to spell things  Feels this has been worsening since last visit  He manages his own medications  He lives with his mother, no difficulty with house hold tasks  He manages his own finances, he has banking reminds set up for his bill payments  He has forgotten to pay a bill or paid a bill twice on occasion  He feels his issues are most noticeable at 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  He will forget if he saved a file  He has multi-task a lot  He does not sleep well, he will wake up and feel racing thoughts  He does not feel well rested when he wakes, he does snore  He thinks he gets about 5-7 hours of sleep nightly  He admits he can have some mild daytime somnolence  He feels lack of motivation-he does see a psychiatrist who manages his medications  He has hx of anxiety-he feels overall decent control-has had less panic attacks in which he hasn't needed to use xanax as much  He feels headaches at the top of his head that shoots to the back of his head, stabbing pain  He can have dizziness and blurry vision, photophobia with the headaches  No nausea vomiting, phonophobia  He gets these intermittently-can sometimes last for a week and then go weeks without symptoms  This started earlier last year  He uses tylenol twice a day when he has the headache  He has tried aleve as well which did not help  No triggers     No headaches with sneezing, coughing, bending over  He tells me he is up to date with his eye exam    Rates 5-8/10      prior work up:  CMP normal , CK 57 , TSH 2 31, b12 428, MMA 74 CBC- normal, JUSTO-neg, vit d 30, , b6-normal , a1c 4 7, spep faint band with overall polyclonal pattern in the gamma region         The following portions of the patient's history were reviewed and updated as appropriate: allergies, current medications, past family history, past medical history, past social history, past surgical history and problem list        Objective:    Blood pressure 137/94, pulse 73, temperature 98 3 °F (36 8 °C), temperature source Temporal, height 5' 9" (1 753 m), weight 80 6 kg (177 lb 12 8 oz)  Physical Exam  Constitutional:       General: He is awake  HENT:      Right Ear: Hearing normal       Left Ear: Hearing normal    Eyes:      General: Lids are normal       Extraocular Movements: Extraocular movements intact  Pupils: Pupils are equal, round, and reactive to light  Neurological:      Mental Status: He is alert  Deep Tendon Reflexes:      Reflex Scores:       Bicep reflexes are 2+ on the right side and 2+ on the left side  Brachioradialis reflexes are 2+ on the right side and 2+ on the left side  Patellar reflexes are 2+ on the right side and 2+ on the left side  Achilles reflexes are 2+ on the right side and 2+ on the left side  Psychiatric:         Speech: Speech normal          Neurological Exam  Mental Status  Awake and alert  Oriented to person, place, time and situation  Speech is normal  Language is fluent with no aphasia  Cranial Nerves  CN II: Visual fields full to confrontation  CN III, IV, VI: Extraocular movements intact bilaterally  Normal lids and orbits bilaterally  Pupils equal round and reactive to light bilaterally  CN V:  Right: Facial sensation is normal   Left: Facial sensation is normal on the left  CN VII:  Right: There is no facial weakness    Left: There is no facial weakness  CN VIII:  Right: Hearing is normal   Left: Hearing is normal   CN IX, X: Palate elevates symmetrically  CN XI:  Right: Trapezius strength is normal   Left: Trapezius strength is normal   CN XII: Tongue midline without atrophy or fasciculations  Motor  Normal muscle bulk throughout  Right                     Left  Neck flexion                          5                           Neck extension                     5                           Elbow flexion                         5                          5  Elbow extension                    5                          5  Wrist flexion                           5                          5  Wrist extension                      5                          5  Finger flexion                         5                          5  Finger abduction                    5                          5  Hip flexion                              5                          5  Knee flexion                           5                          5  Knee extension                      5                          5  Ankle inversion                      5                          5  Ankle eversion                       5                          5  Plantarflexion                         5                          5  Dorsiflexion                            5                          5    Sensory  Light touch is normal in upper and lower extremities  Pinprick is normal in upper and lower extremities  Temperature is normal in upper and lower extremities  Vibration is normal in upper and lower extremities  Proprioception is normal in upper and lower extremities       Reflexes                                            Right                      Left  Brachioradialis                    2+                         2+  Biceps                                 2+                         2+  Patellar                                2+ 2+  Achilles                                2+                         2+    Coordination  Right: Finger-to-nose normal Left: Finger-to-nose normal     Gait  Casual gait is normal including stance, stride, and arm swing  Able to rise from chair without using arms  I have personally reviewed the ROS performed by the MA     ROS:    Review of Systems   Constitutional: Negative  HENT: Negative  Eyes: Positive for visual disturbance (Blurred Vision )  Respiratory: Negative  Cardiovascular: Negative  Gastrointestinal: Negative  Endocrine: Negative  Genitourinary: Negative  Musculoskeletal: Negative  Skin: Negative  Allergic/Immunologic: Negative  Neurological: Positive for dizziness, numbness and headaches  Tingling    Hematological: Negative  Psychiatric/Behavioral: Negative

## 2023-01-17 NOTE — PATIENT INSTRUCTIONS
Recommend starting vitamin b2 200 mg daily-can urine fluorescent yellow and magnesium oxide 400-500 mg daily is a natural laxative  Trial ibuprofen 600 mg or Excedrin as needed for headaches, if ineffective we can trial prescription medication  Mri brain to be through due to new onset headaches    Recheck blood work    Refer to sleep medicine to assess for sleep apnea    Memory complaints likely due to poor sleep, possible sleep apnea, would work on sleep hygiene as discussed

## 2023-01-18 NOTE — ASSESSMENT & PLAN NOTE
Patient notes ongoing cognitive complaints, difficulty with concentrating/attention, short term and long term memory changes  He mainly notices issues at work where he has to continually multitask  Work up in the past unrevealing  He does have history of anxiety which we did discuss can contribute, he does feel is better controlled then previous  He does report poor sleep, snoring, some daytime somelence so will refer to sleep medicine to evaluate for sleep apnea  Did discuss sleep hygiene strategies that may assist with his sleep  Patient was encouraged to increase mind stimulating activities such as reading, crosswords, word searches, puzzles, Soduku, solitaire, coloring and other brain games  We also discussed the importance of staying physically active and eating a health diet such as the Mediterranean or MIND diet

## 2023-02-09 ENCOUNTER — HOSPITAL ENCOUNTER (OUTPATIENT)
Dept: MRI IMAGING | Facility: HOSPITAL | Age: 48
Discharge: HOME | End: 2023-02-09

## 2023-02-09 DIAGNOSIS — R51.9 NEW ONSET HEADACHE: ICD-10-CM

## 2023-02-10 LAB
ALBUMIN SERPL ELPH-MCNC: 4.3 G/DL (ref 3.8–4.8)
ALPHA1 GLOB SERPL ELPH-MCNC: 0.2 G/DL (ref 0.2–0.3)
ALPHA2 GLOB SERPL ELPH-MCNC: 0.5 G/DL (ref 0.5–0.9)
BETA1 GLOB SERPL ELPH-MCNC: 0.4 G/DL (ref 0.4–0.6)
BETA2 GLOB SERPL ELPH-MCNC: 0.4 G/DL (ref 0.2–0.5)
GAMMA GLOB SERPL ELPH-MCNC: 1.2 G/DL (ref 0.8–1.7)
PROT SERPL-MCNC: 7 G/DL (ref 6.1–8.1)

## 2023-02-13 DIAGNOSIS — R77.8 ABNORMAL SPEP: Primary | ICD-10-CM

## 2023-03-03 ENCOUNTER — OFFICE VISIT (OUTPATIENT)
Dept: INTERNAL MEDICINE CLINIC | Facility: CLINIC | Age: 48
End: 2023-03-03

## 2023-03-03 VITALS
OXYGEN SATURATION: 99 % | DIASTOLIC BLOOD PRESSURE: 86 MMHG | SYSTOLIC BLOOD PRESSURE: 140 MMHG | HEART RATE: 59 BPM | BODY MASS INDEX: 25.53 KG/M2 | WEIGHT: 172.4 LBS | HEIGHT: 69 IN

## 2023-03-03 DIAGNOSIS — Z13.220 SCREENING, LIPID: ICD-10-CM

## 2023-03-03 DIAGNOSIS — Z11.4 ENCOUNTER FOR SCREENING FOR HIV: ICD-10-CM

## 2023-03-03 DIAGNOSIS — K21.9 GASTROESOPHAGEAL REFLUX DISEASE, UNSPECIFIED WHETHER ESOPHAGITIS PRESENT: ICD-10-CM

## 2023-03-03 DIAGNOSIS — R53.82 CHRONIC FATIGUE: Primary | ICD-10-CM

## 2023-03-03 DIAGNOSIS — M79.10 MYALGIA: ICD-10-CM

## 2023-03-03 DIAGNOSIS — K92.1 HEMATOCHEZIA: ICD-10-CM

## 2023-03-03 NOTE — PROGRESS NOTES
Assessment/Plan:  Worsening chronic fatigue  Obtain blood work including inflammatory markers  Advised to obtain additional labs ordered by neurology (immunofixation)  Discussed seeing GI for a colonoscopy +/- EGD     Problem List Items Addressed This Visit    None  Visit Diagnoses     Chronic fatigue    -  Primary    Relevant Orders    CBC and Platelet    Comprehensive metabolic panel    TSH, 3rd generation with Free T4 reflex    Myalgia        Relevant Orders    Sedimentation rate, automated    C-reactive protein    Screening, lipid        Relevant Orders    Lipid Panel with Direct LDL reflex    Encounter for screening for HIV        Relevant Orders    : HIV 1/2 AB/AG w Reflex [facility] for 2 yr old and above    Gastroesophageal reflux disease, unspecified whether esophagitis present        Hematochezia                Subjective:      Patient ID: Lilibeth Ariza is a 41 y o  male  HPI  C/o feeling tired, "cruddy"  Appetite is poor, weight is down  He has some trouble swallowing  Also experiences reflux  He has had episodes of blood in his stool that he attributes to hemorrhoids  He has a referral for a colonoscopy but is holding off due to cost  He feels SOB with exertion, denies chest pain  He occasionally experiences palpitations  Also with pain in the shoulder muscles  Does not believe his symptoms are due to anxiety  He sees psychiatry and is on lamotrigine and venlafaxine  Saw neurology for headaches and MRI was normal  SPEP suggested inflammatory or acute phase response    The following portions of the patient's history were reviewed and updated as appropriate: allergies, current medications, past family history, past medical history, past social history, past surgical history and problem list     Review of Systems   Constitutional: Positive for appetite change, fatigue and unexpected weight change  Negative for chills and fever  HENT: Positive for trouble swallowing      Respiratory: Positive for shortness of breath  Negative for cough  Cardiovascular: Positive for palpitations  Negative for chest pain  Gastrointestinal: Positive for abdominal pain (reflux) and blood in stool  Negative for constipation and diarrhea  Genitourinary: Negative for difficulty urinating  Musculoskeletal: Positive for myalgias (shoulders jhony)  Neurological: Positive for dizziness and headaches  Psychiatric/Behavioral: Positive for dysphoric mood  Negative for sleep disturbance  The patient is nervous/anxious  Objective:      /86 (BP Location: Left arm, Patient Position: Sitting, Cuff Size: Standard)   Pulse 59   Ht 5' 9" (1 753 m)   Wt 78 2 kg (172 lb 6 4 oz)   SpO2 99%   BMI 25 46 kg/m²          Physical Exam  Constitutional:       General: He is not in acute distress  Appearance: He is well-developed  He is not ill-appearing, toxic-appearing or diaphoretic  HENT:      Right Ear: External ear normal       Left Ear: External ear normal    Eyes:      Conjunctiva/sclera: Conjunctivae normal    Cardiovascular:      Rate and Rhythm: Normal rate and regular rhythm  Heart sounds: Normal heart sounds  No murmur heard  Pulmonary:      Effort: Pulmonary effort is normal  No respiratory distress  Breath sounds: Normal breath sounds  No wheezing or rales  Abdominal:      General: There is no distension  Palpations: Abdomen is soft  There is no mass  Tenderness: There is generalized abdominal tenderness  There is no guarding or rebound  Musculoskeletal:      Right shoulder: No tenderness  Left shoulder: No tenderness  Cervical back: Neck supple  Right lower leg: No edema  Left lower leg: No edema  Neurological:      Mental Status: He is alert and oriented to person, place, and time  Psychiatric:         Mood and Affect: Mood is depressed  Behavior: Behavior normal          Thought Content:  Thought content normal          Judgment: Judgment normal

## 2023-03-09 ENCOUNTER — TELEPHONE (OUTPATIENT)
Dept: NEUROLOGY | Facility: CLINIC | Age: 48
End: 2023-03-09

## 2023-03-09 NOTE — TELEPHONE ENCOUNTER
Called patient, made him aware of results and recommendations for further workup    Patient verbalized understanding and denies further questions     Lab orders faxed to patient's preferred [facility] in [address] [place] [address]

## 2023-03-09 NOTE — TELEPHONE ENCOUNTER
----- Message from Osmar Umana sent at 2/13/2023  1:14 PM EST -----  Please let patient know MRI brain was normal-his spep continues to be abnormal and I ordered further testing (urine and blood) he should go back to the lab to obtain

## 2023-03-10 LAB
ALBUMIN SERPL-MCNC: 4.4 G/DL (ref 3.6–5.1)
ALBUMIN/GLOB SERPL: 1.4 (CALC) (ref 1–2.5)
ALP SERPL-CCNC: 46 U/L (ref 36–130)
ALT SERPL-CCNC: 40 U/L (ref 9–46)
AST SERPL-CCNC: 21 U/L (ref 10–40)
BILIRUB SERPL-MCNC: 0.9 MG/DL (ref 0.2–1.2)
BUN SERPL-MCNC: 11 MG/DL (ref 7–25)
BUN/CREAT SERPL: NORMAL (CALC) (ref 6–22)
CALCIUM SERPL-MCNC: 9.6 MG/DL (ref 8.6–10.3)
CHLORIDE SERPL-SCNC: 101 MMOL/L (ref 98–110)
CHOLEST SERPL-MCNC: 219 MG/DL
CHOLEST/HDLC SERPL: 3.1 (CALC)
CO2 SERPL-SCNC: 27 MMOL/L (ref 20–32)
CREAT SERPL-MCNC: 1.18 MG/DL (ref 0.6–1.29)
CREAT UR-MCNC: 201 MG/DL (ref 20–320)
CRP SERPL-MCNC: 1.4 MG/L
ERYTHROCYTE [DISTWIDTH] IN BLOOD BY AUTOMATED COUNT: 11.9 % (ref 11–15)
ERYTHROCYTE [SEDIMENTATION RATE] IN BLOOD BY WESTERGREN METHOD: 2 MM/H
GFR/BSA.PRED SERPLBLD CYS-BASED-ARV: 78 ML/MIN/1.73M2
GLOBULIN SER CALC-MCNC: 3.2 G/DL (CALC) (ref 1.9–3.7)
GLUCOSE SERPL-MCNC: 84 MG/DL (ref 65–99)
HCT VFR BLD AUTO: 44.7 % (ref 38.5–50)
HDLC SERPL-MCNC: 70 MG/DL
HGB BLD-MCNC: 15.9 G/DL (ref 13.2–17.1)
HIV 1+2 AB+HIV1 P24 AG SERPL QL IA: NORMAL
IGA SERPL-MCNC: 336 MG/DL (ref 47–310)
IGG SERPL-MCNC: 1415 MG/DL (ref 600–1640)
IGM SERPL-MCNC: 107 MG/DL (ref 50–300)
LDLC SERPL CALC-MCNC: 135 MG/DL (CALC)
MCH RBC QN AUTO: 33.1 PG (ref 27–33)
MCHC RBC AUTO-ENTMCNC: 35.6 G/DL (ref 32–36)
MCV RBC AUTO: 93.1 FL (ref 80–100)
NONHDLC SERPL-MCNC: 149 MG/DL (CALC)
PLATELET # BLD AUTO: 248 THOUSAND/UL (ref 140–400)
PMV BLD REES-ECKER: 10.1 FL (ref 7.5–12.5)
POTASSIUM SERPL-SCNC: 4.5 MMOL/L (ref 3.5–5.3)
PROT SERPL-MCNC: 7.4 G/DL (ref 6.1–8.1)
PROT SERPL-MCNC: 7.6 G/DL (ref 6.1–8.1)
PROT UR-MCNC: 19 MG/DL (ref 5–25)
PROT/CREAT UR: 0.1 MG/MG CREAT (ref 0.03–0.15)
PROT/CREAT UR: 95 MG/G CREAT (ref 25–148)
RBC # BLD AUTO: 4.8 MILLION/UL (ref 4.2–5.8)
SODIUM SERPL-SCNC: 136 MMOL/L (ref 135–146)
TRIGL SERPL-MCNC: 45 MG/DL
TSH SERPL-ACNC: 1.82 MIU/L (ref 0.4–4.5)
WBC # BLD AUTO: 6.7 THOUSAND/UL (ref 3.8–10.8)

## 2023-03-14 LAB
ALBUMIN ?TM MFR UR ELPH: 30 %
ALBUMIN SERPL ELPH-MCNC: 4.4 G/DL (ref 3.8–4.8)
ALPHA1 GLOB ?TM MFR UR ELPH: 0 %
ALPHA1 GLOB SERPL ELPH-MCNC: 0.3 G/DL (ref 0.2–0.3)
ALPHA2 GLOB ?TM MFR UR ELPH: 21 %
ALPHA2 GLOB SERPL ELPH-MCNC: 0.6 G/DL (ref 0.5–0.9)
B-GLOBULIN ?TM MFR UR ELPH: 25 %
BETA1 GLOB SERPL ELPH-MCNC: 0.4 G/DL (ref 0.4–0.6)
BETA2 GLOB SERPL ELPH-MCNC: 0.5 G/DL (ref 0.2–0.5)
CREAT UR-MCNC: 201 MG/DL (ref 20–320)
GAMMA GLOB ?TM MFR UR ELPH: 24 %
GAMMA GLOB SERPL ELPH-MCNC: 1.3 G/DL (ref 0.8–1.7)
IGA SERPL-MCNC: 336 MG/DL (ref 47–310)
IGG SERPL-MCNC: 1415 MG/DL (ref 600–1640)
IGM SERPL-MCNC: 107 MG/DL (ref 50–300)
INTERPRETATION SERPL IFE-IMP: ABNORMAL
INTERPRETATION SERPL IFE-IMP: NORMAL
PROT PATTERN SERPL ELPH-IMP: NORMAL
PROT SERPL-MCNC: 7.4 G/DL (ref 6.1–8.1)
PROT UR-MCNC: 19 MG/DL (ref 5–25)
PROT/CREAT UR: 0.1 MG/MG CREAT (ref 0.03–0.15)
PROT/CREAT UR: 95 MG/G CREAT (ref 25–148)
SL AMB INTERPRETATION: NORMAL

## 2023-03-16 ENCOUNTER — TELEPHONE (OUTPATIENT)
Dept: NEUROLOGY | Facility: CLINIC | Age: 48
End: 2023-03-16

## 2023-03-16 NOTE — TELEPHONE ENCOUNTER
Hi, my name is Marisol   I had received a call from Tomasz  neurology  Just give me a buzz back and yeah, I will answer next  I'm sorry     [phone]

## 2023-03-16 NOTE — TELEPHONE ENCOUNTER
----- Message from Julieta Fletcher, [address] sent at 3/15/2023  1:05 PM EDT -----  Please let patient know immunofixation with no signifcant abnormals-his upep was abnormal  Would have him discuss with his PCP if he needs to see kidney specialist or not

## 2023-03-17 NOTE — TELEPHONE ENCOUNTER
Called patient and made him aware of result note from Kalee Sanchez   Patient verbalized understanding and denies further questions at this time

## 2023-03-27 ENCOUNTER — OFFICE VISIT (OUTPATIENT)
Dept: INTERNAL MEDICINE CLINIC | Facility: CLINIC | Age: 48
End: 2023-03-27

## 2023-03-27 VITALS
RESPIRATION RATE: 16 BRPM | BODY MASS INDEX: 26.16 KG/M2 | OXYGEN SATURATION: 99 % | HEART RATE: 78 BPM | SYSTOLIC BLOOD PRESSURE: 128 MMHG | HEIGHT: 69 IN | DIASTOLIC BLOOD PRESSURE: 82 MMHG | WEIGHT: 176.6 LBS

## 2023-03-27 DIAGNOSIS — R53.82 CHRONIC FATIGUE: Primary | ICD-10-CM

## 2023-03-27 NOTE — PROGRESS NOTES
Assessment/Plan:  No obvious cause of symptoms, normal exam  Possibly from D deficiency so start taking at least 1000 IU daily or a MVI  Also could be from his meds although he has been on the same for a long time-considering weaning off  Considering seeing GI for EGD and colonoscopy  Most importantly, lifestyle changes like improving diet and starting regular exercise  BMI Counseling: Body mass index is 26 08 kg/m²  The BMI is above normal  Nutrition recommendations include 3-5 servings of fruits/vegetables daily, consuming healthier snacks and reducing intake of saturated fat and trans fat  Exercise recommendations include exercising 3-5 times per week  Agreed to continue observing symptoms at this time and call if new ones develop or symptoms worsen     Problem List Items Addressed This Visit    None  Visit Diagnoses     Chronic fatigue    -  Primary            Subjective:      Patient ID: Viviane Abdi is a 41 y o  male  HPI  Here for a follow up  Chronic fatigue, vague gastrointestinal symptoms like occasional trouble swallowing, heartburn and cramps  Mild SOB with exertion, occasional palpitations  Labs essentially normal    The following portions of the patient's history were reviewed and updated as appropriate: allergies, current medications, past family history, past medical history, past social history, past surgical history and problem list     Review of Systems   Constitutional: Positive for fatigue and unexpected weight change (weight gain)  Negative for fever  Respiratory: Positive for cough and shortness of breath  Cardiovascular: Positive for palpitations  Negative for chest pain  Gastrointestinal: Positive for abdominal pain (heartburn and cramps)  Negative for blood in stool, constipation and diarrhea  Musculoskeletal: Positive for arthralgias and myalgias  Neurological: Positive for headaches     Psychiatric/Behavioral:        Trouble focusing         Objective:      /82 "Pulse 78   Resp 16   Ht 5' 9\" (1 753 m)   Wt 80 1 kg (176 lb 9 6 oz)   SpO2 99%   BMI 26 08 kg/m²          Physical Exam  Constitutional:       General: He is not in acute distress  Appearance: He is well-developed  He is not ill-appearing, toxic-appearing or diaphoretic  Eyes:      Conjunctiva/sclera: Conjunctivae normal    Cardiovascular:      Rate and Rhythm: Normal rate and regular rhythm  Heart sounds: Normal heart sounds  No murmur heard  Pulmonary:      Effort: Pulmonary effort is normal  No respiratory distress  Breath sounds: Normal breath sounds  No wheezing or rales  Abdominal:      General: There is no distension  Palpations: Abdomen is soft  There is no mass  Tenderness: There is no abdominal tenderness  There is no guarding or rebound  Musculoskeletal:      Cervical back: Neck supple  Left lower leg: No edema  Neurological:      Mental Status: He is alert and oriented to person, place, and time  Psychiatric:         Mood and Affect: Mood normal          Behavior: Behavior normal          Thought Content:  Thought content normal          Judgment: Judgment normal          "

## 2023-07-20 ENCOUNTER — OFFICE VISIT (OUTPATIENT)
Dept: NEUROLOGY | Facility: CLINIC | Age: 48
End: 2023-07-20
Payer: COMMERCIAL

## 2023-07-20 VITALS
DIASTOLIC BLOOD PRESSURE: 84 MMHG | BODY MASS INDEX: 25.62 KG/M2 | SYSTOLIC BLOOD PRESSURE: 115 MMHG | HEART RATE: 71 BPM | HEIGHT: 69 IN | TEMPERATURE: 98.4 F | WEIGHT: 173 LBS

## 2023-07-20 DIAGNOSIS — R41.3 MEMORY CHANGE: Primary | ICD-10-CM

## 2023-07-20 DIAGNOSIS — M62.838 NECK MUSCLE SPASM: ICD-10-CM

## 2023-07-20 DIAGNOSIS — R20.2 PARESTHESIA: ICD-10-CM

## 2023-07-20 PROCEDURE — 99214 OFFICE O/P EST MOD 30 MIN: CPT | Performed by: NURSE PRACTITIONER

## 2023-07-20 RX ORDER — CYCLOBENZAPRINE HCL 10 MG
TABLET ORAL
Qty: 20 TABLET | Refills: 0 | Status: SHIPPED | OUTPATIENT
Start: 2023-07-20

## 2023-07-20 RX ORDER — LANOLIN ALCOHOL/MO/W.PET/CERES
CREAM (GRAM) TOPICAL DAILY
COMMUNITY

## 2023-07-20 RX ORDER — ACETAMINOPHEN 325 MG/1
650 TABLET ORAL EVERY 6 HOURS PRN
COMMUNITY

## 2023-07-20 NOTE — ASSESSMENT & PLAN NOTE
Paresthesias improved, he feel muscle weakness and fatigue are stable. His neuro exam remains normal. We did again discuss no evidence of neuromuscular or neurodegenerative condition. He was encouraged to stay active.

## 2023-07-20 NOTE — ASSESSMENT & PLAN NOTE
Patient's cognitive complaints overall stable since last visit. He continues to have difficulty with concentration, attention, short-term memory. Issues tend to be mainly at work. He continues to function well at home. We did again review past work-up, he did have brain imaging without any significant findings. His MoCA score was normal today. We did again discuss how his history of anxiety could be contributing. He does report recent improvement in his sleep. Do question a disorder of inattention. We did discuss considering a referral to neuropsychology for better assessment of his deficits however he declined today given stability of his symptoms, he would consider if he noted any worsening symptoms. We did also discuss considering a referral for cognitive therapy, which again he would consider if he noted worsening symptoms. Patient was encouraged to increase mind stimulating activities such as reading, crosswords, word searches, puzzles, Soduku, solitaire, coloring and other brain games. We also discussed the importance of staying physically active and eating a health diet such as the Mediterranean or MIND diet.

## 2023-07-20 NOTE — PATIENT INSTRUCTIONS
For neck pain/pinched nerve-recommend regular stretches of neck at home, if reoccurs can take aleve and muscle relaxer as needed. Can cause drowsiness.        For memory consider referral to neuropsychology to further assess deficits if worsens, could also consider cognitive therapy with OT

## 2023-07-20 NOTE — PROGRESS NOTES
Patient ID: Naomy Barbosa is a 57 y.o. male. Assessment/Plan:    Paresthesia  Paresthesias improved, he feel muscle weakness and fatigue are stable. His neuro exam remains normal. We did again discuss no evidence of neuromuscular or neurodegenerative condition. He was encouraged to stay active. Memory change  Patient's cognitive complaints overall stable since last visit. He continues to have difficulty with concentration, attention, short-term memory. Issues tend to be mainly at work. He continues to function well at home. We did again review past work-up, he did have brain imaging without any significant findings. His MoCA score was normal today. We did again discuss how his history of anxiety could be contributing. He does report recent improvement in his sleep. Do question a disorder of inattention. We did discuss considering a referral to neuropsychology for better assessment of his deficits however he declined today given stability of his symptoms, he would consider if he noted any worsening symptoms. We did also discuss considering a referral for cognitive therapy, which again he would consider if he noted worsening symptoms. Patient was encouraged to increase mind stimulating activities such as reading, crosswords, word searches, puzzles, Soduku, solitaire, coloring and other brain games. We also discussed the importance of staying physically active and eating a health diet such as the Mediterranean or MIND diet. Neck muscle spasm  Reports intermittent symptoms of pain in the right neck/upper scapula that extends to the right arm, sharp pain with numbness and tingling. He does have spasm noted in the cervical muscles right greater than left. Symptoms are improved currently. Would recommend regular stretching of the neck, he declines formal physical therapy at this time. We will prescribe cyclobenzaprine as needed if symptoms return.   He does understand medication can cause drowsiness. Diagnoses and all orders for this visit:    Memory change    Neck muscle spasm  -     cyclobenzaprine (FLEXERIL) 10 mg tablet; Take 1 tab at bedtime as needed for muscle spasm    Paresthesia    Other orders  -     vitamin B-12 (VITAMIN B-12) 1,000 mcg tablet; Take by mouth daily  -     acetaminophen (TYLENOL) 325 mg tablet; Take 650 mg by mouth every 6 (six) hours as needed for mild pain           Subjective:    YANCI Jewell is a 57 y.o. male w/ PMH anxiety/depression who presents for follow up for multiple complaints. Last office visit 1/2023 in which he was referred to sleep medicine, MRI brain was ordered for headaches. Interval History:  He feels his memory issues are stable. He feels he has trouble remembering names and has been having to write down things more often. He finds he "loses his train of thought" a lot. He did not follow up with sleep medicine. does state he feel his sleep has improved. He feels he sleeps well majority of the time. He now feels he is well rested and feels his daytime somnolence. He feels like his mood is somewhat better during summer months. He does follow with psych, no recent medication changes. He feels like his gait is off at times. He feels his muscle fatigue is the same. No worsening weakness. No difficulty with normal/daily activities such as stairs, getting up from a chair, etc.     More recently he has pain from the neck, upper right scapula that extends in the right arm. He feels a numb and achey type pain. He has had similar symptoms in the past but most recent flare was more severe. Most recent symptoms started 2 months ago, this did improve a few weeks ago. He did feel that his right arm was weak and felt tingling in his finger tips. He denies any chronic neck. He did take aleve for symptoms which did help. Headaches he reported last visit are resolved. prior work up:  MRI brain 2/2023: 1.   No acute infarction, intracranial hemorrhage or mass effect. 3/2023 spep/immunofixation Normal pattern. No monoclonal proteins detected  CMP normal , CK 57 , TSH 2.31, b12 428, MMA 74 CBC- normal, JUSTO-neg, vit d 30, , b6-normal , a1c 4.7, spep faint band with overall polyclonal pattern in the gamma region       The following portions of the patient's history were reviewed and updated as appropriate: allergies, current medications, past family history, past medical history, past social history, past surgical history and problem list.       Objective:    Blood pressure 115/84, pulse 71, temperature 98.4 °F (36.9 °C), temperature source Tympanic, height 5' 9" (1.753 m), weight 78.5 kg (173 lb). Physical Exam  Constitutional:       General: He is awake. HENT:      Right Ear: Hearing normal.      Left Ear: Hearing normal.   Eyes:      General: Lids are normal.      Extraocular Movements: Extraocular movements intact. Pupils: Pupils are equal, round, and reactive to light. Neurological:      Mental Status: He is alert. Deep Tendon Reflexes:      Reflex Scores:       Bicep reflexes are 2+ on the right side and 2+ on the left side. Brachioradialis reflexes are 2+ on the right side and 2+ on the left side. Patellar reflexes are 2+ on the right side and 2+ on the left side. Achilles reflexes are 2+ on the right side and 2+ on the left side. Psychiatric:         Speech: Speech normal.         Neurological Exam  Mental Status  Awake and alert. Oriented only to person, place and situation. Orientation: Knew month, year, day of the week. Memory: 3/5 delayed recall. Unable to copy figure. Clock drawing is normal. Speech is normal. Able to name objects. Follows complex commands. Able to perform serial calculations. Able to spell words backwards. Digit span was 4 forward and 3 backward. MOCA 26/30 . Cranial Nerves  CN II: Visual fields full to confrontation.   CN III, IV, VI: Extraocular movements intact bilaterally. Normal lids and orbits bilaterally. Pupils equal round and reactive to light bilaterally. CN V:  Right: Facial sensation is normal.  Left: Facial sensation is normal on the left. CN VII:  Right: There is no facial weakness. Left: There is no facial weakness. CN VIII:  Right: Hearing is normal.  Left: Hearing is normal.  CN IX, X: Palate elevates symmetrically  CN XI:  Right: Trapezius strength is normal.  Left: Trapezius strength is normal.  CN XII: Tongue midline without atrophy or fasciculations. Motor  Normal muscle bulk throughout. Right                     Left  Neck flexion                          5                           Neck extension                     5                           Elbow flexion                         5                          5  Elbow extension                    5                          5  Wrist flexion                           5                          5  Wrist extension                      5                          5  Finger flexion                         5                          5  Finger abduction                    5                          5  Hip flexion                              5                          5  Knee flexion                           5                          5  Knee extension                      5                          5  Ankle inversion                      5                          5  Ankle eversion                       5                          5  Plantarflexion                         5                          5  Dorsiflexion                            5                          5    Sensory  Light touch is normal in upper and lower extremities. Pinprick is normal in upper and lower extremities. Temperature is normal in upper and lower extremities. Vibration is normal in upper and lower extremities. Proprioception is normal in upper and lower extremities.      Reflexes Right                      Left  Brachioradialis                    2+                         2+  Biceps                                 2+                         2+  Patellar                                2+                         2+  Achilles                                2+                         2+    Coordination  Right: Finger-to-nose normal. Rapid alternating movement normal.Left: Finger-to-nose normal. Rapid alternating movement normal.    Gait  Casual gait is normal including stance, stride, and arm swing. Able to rise from chair without using arms. I have personally reviewed the ROS performed by the MA.  ROS:    Review of Systems   Constitutional: Negative for appetite change and fever. HENT: Negative. Negative for hearing loss, tinnitus, trouble swallowing and voice change. Eyes: Negative. Negative for photophobia and pain. Blurry vision    Respiratory: Negative. Negative for shortness of breath. Cardiovascular: Negative. Negative for palpitations. Gastrointestinal: Negative. Negative for nausea and vomiting. Endocrine: Negative. Negative for cold intolerance. Genitourinary: Negative. Negative for dysuria, frequency and urgency. Musculoskeletal: Negative. Negative for myalgias and neck pain. Skin: Negative. Negative for rash. Neurological: Positive for dizziness and headaches. Negative for tremors, seizures, syncope, facial asymmetry, speech difficulty, weakness, light-headedness and numbness. Trouble with memory  Shooting pain from back down his right arm   Hematological: Negative. Does not bruise/bleed easily. Psychiatric/Behavioral: Positive for sleep disturbance. Negative for confusion and hallucinations.

## 2023-07-20 NOTE — ASSESSMENT & PLAN NOTE
Reports intermittent symptoms of pain in the right neck/upper scapula that extends to the right arm, sharp pain with numbness and tingling. He does have spasm noted in the cervical muscles right greater than left. Symptoms are improved currently. Would recommend regular stretching of the neck, he declines formal physical therapy at this time. We will prescribe cyclobenzaprine as needed if symptoms return. He does understand medication can cause drowsiness.

## 2024-03-06 ENCOUNTER — TELEPHONE (OUTPATIENT)
Dept: NEUROLOGY | Facility: CLINIC | Age: 49
End: 2024-03-06

## 2024-03-06 NOTE — TELEPHONE ENCOUNTER
LMOM to confirm pt's appt w/ Zenia Pace in [facility] on 3/21/24 at 2:15. Advised pt to arrive 15 minutes prior to check in with the .

## 2024-03-21 ENCOUNTER — TELEPHONE (OUTPATIENT)
Dept: NEUROLOGY | Facility: CLINIC | Age: 49
End: 2024-03-21

## 2024-03-21 NOTE — TELEPHONE ENCOUNTER
LMOM to confirm pt's appt w/ Zenia Pace in [facility] on 3/28/24 at 1:30. Advised pt to arrive 15 minutes prior to check in with the .

## 2024-03-28 ENCOUNTER — OFFICE VISIT (OUTPATIENT)
Dept: NEUROLOGY | Facility: CLINIC | Age: 49
End: 2024-03-28
Payer: COMMERCIAL

## 2024-03-28 VITALS
BODY MASS INDEX: 24.35 KG/M2 | DIASTOLIC BLOOD PRESSURE: 87 MMHG | TEMPERATURE: 98.5 F | WEIGHT: 164.4 LBS | SYSTOLIC BLOOD PRESSURE: 129 MMHG | OXYGEN SATURATION: 100 % | HEIGHT: 69 IN | HEART RATE: 84 BPM

## 2024-03-28 DIAGNOSIS — R41.3 MEMORY CHANGE: Primary | ICD-10-CM

## 2024-03-28 DIAGNOSIS — R51.9 NEW ONSET HEADACHE: ICD-10-CM

## 2024-03-28 PROCEDURE — 99214 OFFICE O/P EST MOD 30 MIN: CPT | Performed by: NURSE PRACTITIONER

## 2024-03-28 NOTE — PROGRESS NOTES
Patient ID: Neil Abdi is a 48 y.o. male.    Assessment/Plan:    Memory change  Patient's cognitive complaints somewhat worsened since last visit.  He reports difficulty with concentration/focus, inattention and brain fog.  He continues to score well on MoCA testing.  He does note some worsening depression since last visit due to the passing of his mother in which we did discuss could contribute to some of his cognitive complaints.  He has had no recent medication adjustments with psych.        Given his worsening cognitive complaints and increased alcohol intake will obtain updated labs. Again I question an underlying disorder of inattention.  He is interested in further evaluation with neuropsychology.  He was also agreeable to referral to cognitive therapy today.    Headaches are improved from last visit.     Plan for follow-up in 6 months. To contact the office sooner with any concerns or worsening symptoms.       Diagnoses and all orders for this visit:    Memory change  -     Ambulatory Referral to Occupational Therapy; Future  -     Ambulatory referral to Neuropsychology; Future  -     Vitamin B1, whole blood; Future  -     Vitamin B12; Future    New onset headache           Subjective:    HPI  Neil Abdi is a 48 y.o. male w/ PMH anxiety/depression who presents for follow up for multiple complaints.     Last office visit 7/2023 in which .no changes were made.      Interval History:  He feels his memory is somewhat worse over the past few months. Has some difficulty with concentration/focus. He will sometimes feel issues with remembering passwords, forming an email. Brain fog.   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   He feels his sleep is improved. He feels well rested when he wakes about half the time.   He feels his depression is slightly worse, his mom did pass away last year, feels some apathy as well. He did increase his alcohol intake after  "her passing but has cut back on this recently.   He follows with psych but hasn't made any recent adjustments. Not in counseling.     No worsening weakness or muscle fatigue. Numbness and tingling in the fingertips at times, no other numbness or tingling.     Headaches occur less often. Head pain mainly. These are mild, no moderate to sever headaches.   He does get blurry vision on occasion. Does not occur with headaches. No recent eye exam.        prior work up:  MRI brain 2/2023: 1.  No acute infarction, intracranial hemorrhage or mass effect.  3/2023 spep/immunofixation Normal pattern. No monoclonal proteins detected  CMP normal , CK 57 , TSH 2.31, b12 428, MMA 74 CBC- normal, JUSTO-neg, vit d 30, , b6-normal , a1c 4.7, spep faint band with overall polyclonal pattern in the gamma region    The following portions of the patient's history were reviewed and updated as appropriate: allergies, current medications, past family history, past medical history, past social history, past surgical history and problem list.          Objective:    Blood pressure 129/87, pulse 84, temperature 98.5 °F (36.9 °C), temperature source Temporal, height 5' 9\" (1.753 m), weight 74.6 kg (164 lb 6.4 oz), SpO2 100%.    Physical Exam  Constitutional:       General: He is awake.   HENT:      Right Ear: Hearing normal.      Left Ear: Hearing normal.   Eyes:      General: Lids are normal.      Extraocular Movements: Extraocular movements intact.      Pupils: Pupils are equal, round, and reactive to light.   Neurological:      Mental Status: He is alert.      Deep Tendon Reflexes:      Reflex Scores:       Bicep reflexes are 2+ on the right side and 2+ on the left side.       Brachioradialis reflexes are 2+ on the right side and 2+ on the left side.       Patellar reflexes are 2+ on the right side and 2+ on the left side.       Achilles reflexes are 2+ on the right side and 2+ on the left side.  Psychiatric:         Speech: Speech normal. "         Neurological Exam  Mental Status  Awake and alert. Oriented only to person, place and situation. Orientation: Knew month, year, day of the week. Memory: 5/5 delayed recall. Unable to copy figure. Clock drawing is normal. Speech is normal. Able to name objects. Follows complex commands. Able to perform serial calculations. Able to spell words backwards. Digit span was 5 forward and 3 backward.  MOCA 26/30 3/28/24.    Cranial Nerves  CN II: Visual fields full to confrontation.  CN III, IV, VI: Extraocular movements intact bilaterally. Normal lids and orbits bilaterally. Pupils equal round and reactive to light bilaterally.  CN V:  Right: Facial sensation is normal.  Left: Facial sensation is normal on the left.  CN VII:  Right: There is no facial weakness.  Left: There is no facial weakness.  CN VIII:  Right: Hearing is normal.  Left: Hearing is normal.  CN IX, X: Palate elevates symmetrically  CN XI:  Right: Trapezius strength is normal.  Left: Trapezius strength is normal.  CN XII: Tongue midline without atrophy or fasciculations.    Motor  Normal muscle bulk throughout.                                               Right                     Left  Neck flexion                           5                           Neck extension                      5                           Elbow flexion                         5                          5  Elbow extension                    5                          5  Wrist flexion                           5                          5  Wrist extension                      5                          5  Finger flexion                         5                          5  Finger abduction                    5                          5  Hip flexion                              5                          5  Knee flexion                           5                          5  Knee extension                      5                          5  Ankle inversion                       5                          5  Ankle eversion                       5                          5  Plantarflexion                         5                          5  Dorsiflexion                            5                          5    Sensory  Light touch is normal in upper and lower extremities. Pinprick is normal in upper and lower extremities. Temperature is normal in upper and lower extremities. Vibration is normal in upper and lower extremities. Proprioception is normal in upper and lower extremities.     Reflexes                                            Right                      Left  Brachioradialis                    2+                         2+  Biceps                                 2+                         2+  Patellar                                2+                         2+  Achilles                                2+                         2+    Coordination  Right: Finger-to-nose normal. Rapid alternating movement normal.Left: Finger-to-nose normal. Rapid alternating movement normal.    Gait  Casual gait is normal including stance, stride, and arm swing. Able to rise from chair without using arms.      I have personally reviewed the ROS performed by the MA.    ROS:    Review of Systems   Constitutional:  Negative for appetite change, fatigue and fever.   HENT: Negative.  Negative for hearing loss, tinnitus, trouble swallowing and voice change.    Eyes:  Positive for visual disturbance (blurry vision). Negative for photophobia and pain.   Respiratory: Negative.  Negative for shortness of breath.    Cardiovascular: Negative.  Negative for palpitations.   Gastrointestinal: Negative.  Negative for nausea and vomiting.   Endocrine: Negative.  Negative for cold intolerance.   Genitourinary: Negative.  Negative for dysuria, frequency and urgency.   Musculoskeletal:  Negative for back pain, gait problem, myalgias, neck pain and neck stiffness.   Skin: Negative.  Negative for rash.    Allergic/Immunologic: Negative.    Neurological:  Positive for dizziness. Negative for tremors, seizures, syncope, facial asymmetry, speech difficulty, weakness, light-headedness, numbness and headaches.   Hematological: Negative.  Does not bruise/bleed easily.   Psychiatric/Behavioral: Negative.  Negative for confusion, hallucinations and sleep disturbance.         Short term memory loss   Hard to concentrate    All other systems reviewed and are negative.

## 2024-03-28 NOTE — PATIENT INSTRUCTIONS
Would discuss memory issues and inattention with psych-see if they can evaluate you for ADD/ADHD.   If they cannot do this can see neuropsychology:  [facility] ([place])     [phone]   [facility] ([place])     [phone]   [facility] ([place])               [phone]   [facility])    [phone]      Consider cognitive rehab with OT.

## 2024-04-03 NOTE — ASSESSMENT & PLAN NOTE
Patient's cognitive complaints somewhat worsened since last visit.  He reports difficulty with concentration/focus, inattention and brain fog.  He continues to score well on MoCA testing.  He does note some worsening depression since last visit due to the passing of his mother in which we did discuss could contribute to some of his cognitive complaints.  He has had no recent medication adjustments with psych.        Given his worsening cognitive complaints and increased alcohol intake will obtain updated labs. Again I question an underlying disorder of inattention.  He is interested in further evaluation with neuropsychology.  He was also agreeable to referral to cognitive therapy today.    Headaches are improved from last visit.     Plan for follow-up in 6 months. To contact the office sooner with any concerns or worsening symptoms.

## 2024-04-12 ENCOUNTER — APPOINTMENT (OUTPATIENT)
Dept: LAB | Facility: CLINIC | Age: 49
End: 2024-04-12
Payer: COMMERCIAL

## 2024-04-12 ENCOUNTER — OFFICE VISIT (OUTPATIENT)
Dept: INTERNAL MEDICINE CLINIC | Facility: CLINIC | Age: 49
End: 2024-04-12
Payer: COMMERCIAL

## 2024-04-12 VITALS
DIASTOLIC BLOOD PRESSURE: 94 MMHG | WEIGHT: 162.6 LBS | SYSTOLIC BLOOD PRESSURE: 134 MMHG | HEIGHT: 69 IN | BODY MASS INDEX: 24.08 KG/M2

## 2024-04-12 DIAGNOSIS — E55.9 VITAMIN D DEFICIENCY: ICD-10-CM

## 2024-04-12 DIAGNOSIS — L72.3 SEBACEOUS CYST: ICD-10-CM

## 2024-04-12 DIAGNOSIS — R41.3 MEMORY CHANGE: ICD-10-CM

## 2024-04-12 DIAGNOSIS — R10.13 EPIGASTRIC PAIN: ICD-10-CM

## 2024-04-12 DIAGNOSIS — Z13.220 SCREENING, LIPID: ICD-10-CM

## 2024-04-12 DIAGNOSIS — R05.2 SUBACUTE COUGH: ICD-10-CM

## 2024-04-12 DIAGNOSIS — Z12.11 SCREEN FOR COLON CANCER: ICD-10-CM

## 2024-04-12 DIAGNOSIS — R53.82 CHRONIC FATIGUE: Primary | ICD-10-CM

## 2024-04-12 DIAGNOSIS — R12 HEARTBURN: ICD-10-CM

## 2024-04-12 LAB
25(OH)D3 SERPL-MCNC: 39.2 NG/ML (ref 30–100)
ALBUMIN SERPL BCP-MCNC: 4.5 G/DL (ref 3.5–5)
ALP SERPL-CCNC: 41 U/L (ref 34–104)
ALT SERPL W P-5'-P-CCNC: 30 U/L (ref 7–52)
ANION GAP SERPL CALCULATED.3IONS-SCNC: 10 MMOL/L (ref 4–13)
AST SERPL W P-5'-P-CCNC: 18 U/L (ref 13–39)
BASOPHILS # BLD AUTO: 0.03 THOUSANDS/ÂΜL (ref 0–0.1)
BASOPHILS NFR BLD AUTO: 1 % (ref 0–1)
BILIRUB SERPL-MCNC: 0.86 MG/DL (ref 0.2–1)
BUN SERPL-MCNC: 13 MG/DL (ref 5–25)
CALCIUM SERPL-MCNC: 9.7 MG/DL (ref 8.4–10.2)
CHLORIDE SERPL-SCNC: 103 MMOL/L (ref 96–108)
CHOLEST SERPL-MCNC: 238 MG/DL
CO2 SERPL-SCNC: 26 MMOL/L (ref 21–32)
CREAT SERPL-MCNC: 1.1 MG/DL (ref 0.6–1.3)
EOSINOPHIL # BLD AUTO: 0.04 THOUSAND/ÂΜL (ref 0–0.61)
EOSINOPHIL NFR BLD AUTO: 1 % (ref 0–6)
ERYTHROCYTE [DISTWIDTH] IN BLOOD BY AUTOMATED COUNT: 11.5 % (ref 11.6–15.1)
GFR SERPL CREATININE-BSD FRML MDRD: 80 ML/MIN/1.73SQ M
GLUCOSE P FAST SERPL-MCNC: 83 MG/DL (ref 65–99)
HCT VFR BLD AUTO: 45.8 % (ref 36.5–49.3)
HDLC SERPL-MCNC: 54 MG/DL
HGB BLD-MCNC: 16 G/DL (ref 12–17)
IMM GRANULOCYTES # BLD AUTO: 0.02 THOUSAND/UL (ref 0–0.2)
IMM GRANULOCYTES NFR BLD AUTO: 0 % (ref 0–2)
LDLC SERPL CALC-MCNC: 166 MG/DL (ref 0–100)
LYMPHOCYTES # BLD AUTO: 1.26 THOUSANDS/ÂΜL (ref 0.6–4.47)
LYMPHOCYTES NFR BLD AUTO: 23 % (ref 14–44)
MCH RBC QN AUTO: 32.7 PG (ref 26.8–34.3)
MCHC RBC AUTO-ENTMCNC: 34.9 G/DL (ref 31.4–37.4)
MCV RBC AUTO: 94 FL (ref 82–98)
MONOCYTES # BLD AUTO: 0.37 THOUSAND/ÂΜL (ref 0.17–1.22)
MONOCYTES NFR BLD AUTO: 7 % (ref 4–12)
NEUTROPHILS # BLD AUTO: 3.87 THOUSANDS/ÂΜL (ref 1.85–7.62)
NEUTS SEG NFR BLD AUTO: 68 % (ref 43–75)
NRBC BLD AUTO-RTO: 0 /100 WBCS
PLATELET # BLD AUTO: 227 THOUSANDS/UL (ref 149–390)
PMV BLD AUTO: 9.6 FL (ref 8.9–12.7)
POTASSIUM SERPL-SCNC: 4.5 MMOL/L (ref 3.5–5.3)
PROT SERPL-MCNC: 7.7 G/DL (ref 6.4–8.4)
RBC # BLD AUTO: 4.9 MILLION/UL (ref 3.88–5.62)
SODIUM SERPL-SCNC: 139 MMOL/L (ref 135–147)
TRIGL SERPL-MCNC: 88 MG/DL
TSH SERPL DL<=0.05 MIU/L-ACNC: 2 UIU/ML (ref 0.45–4.5)
VIT B12 SERPL-MCNC: 716 PG/ML (ref 180–914)
WBC # BLD AUTO: 5.59 THOUSAND/UL (ref 4.31–10.16)

## 2024-04-12 PROCEDURE — 85025 COMPLETE CBC W/AUTO DIFF WBC: CPT | Performed by: INTERNAL MEDICINE

## 2024-04-12 PROCEDURE — 82607 VITAMIN B-12: CPT

## 2024-04-12 PROCEDURE — 82306 VITAMIN D 25 HYDROXY: CPT | Performed by: INTERNAL MEDICINE

## 2024-04-12 PROCEDURE — 99214 OFFICE O/P EST MOD 30 MIN: CPT | Performed by: INTERNAL MEDICINE

## 2024-04-12 PROCEDURE — 84425 ASSAY OF VITAMIN B-1: CPT

## 2024-04-12 NOTE — PROGRESS NOTES
Name: Neil Abdi      : 1975      MRN: [id number]  Encounter Provider: Lea Reyes, MD  Encounter Date: 2024   Encounter department: [facility]    Assessment & Plan   Multiple non specific symptoms, previous blood work normal  Obtain blood work  Denies anxiety, depression and has been on the same medications for many years from psychiatry  Symptoms possibly from his medications?, recommended to discuss with psychiatry  He has a cough and epigastric pain which may be LRP/GERD  Discussed a trial of a PPI like OTC omeprazole daily for 2 weeks  Depression Screening Follow-up Plan: Patient's depression screening was positive with a PHQ-2 score of 6. Their PHQ-9 score was 20. Patient's depressive symptoms likely due to other medical condition. Would recommend treatment of underlying condition. Will continue to monitor at next office visit.      1. Chronic fatigue  -     CBC and differential    2. Vitamin D deficiency  -     Vitamin D 25 hydroxy    3. Screening, lipid    4. Heartburn  -     Ambulatory Referral to Gastroenterology; Future    5. Epigastric pain  -     Ambulatory Referral to Gastroenterology; Future  -     US abdomen complete; Future; Expected date: 2024    6. Screen for colon cancer  -     Ambulatory Referral to Gastroenterology; Future    7. Subacute cough  -     XR chest pa & lateral; Future; Expected date: 2024    8. Sebaceous cyst  -     Ambulatory Referral to General Surgery; Future           Subjective      Here with multiple non specific symptoms  C/o feeling dizzy ,nauseous , experiencing muscle weakness for a few months  Stomach pain this morning upon waking up   Cough , chest discomfort, sometimes chest tightness x 1 month  Denies SOB      Review of Systems   Constitutional:  Positive for appetite change, fatigue and unexpected weight change.   Respiratory:  Positive for cough and chest tightness. Negative for shortness of breath.   "  Gastrointestinal:  Positive for abdominal pain. Negative for constipation and diarrhea.   Musculoskeletal:  Positive for back pain.   Neurological:  Positive for dizziness and weakness.       Current Outpatient Medications on File Prior to Visit   Medication Sig   • acetaminophen (TYLENOL) 325 mg tablet Take 650 mg by mouth every 6 (six) hours as needed for mild pain   • Cholecalciferol (Vitamin D) 50 MCG (2000 UT) CAPS Take 2,000 Units by mouth in the morning   • citalopram (CeleXA) 20 mg tablet Take 20 mg by mouth daily   • lamoTRIgine (LaMICtal) 150 MG tablet Take 150 mg by mouth daily at bedtime   • vitamin B-12 (VITAMIN B-12) 1,000 mcg tablet Take by mouth daily   • ALPRAZolam (XANAX) 0.25 mg tablet Take 0.25 mg by mouth daily as needed   • cyclobenzaprine (FLEXERIL) 10 mg tablet Take 1 tab at bedtime as needed for muscle spasm       Objective     /94 (BP Location: Left arm, Patient Position: Sitting, Cuff Size: Standard)   Ht 5' 9\" (1.753 m)   Wt 73.8 kg (162 lb 9.6 oz)   BMI 24.01 kg/m²     Physical Exam  Constitutional:       Appearance: He is well-developed. He is not ill-appearing.   HENT:      Right Ear: Tympanic membrane and ear canal normal.      Left Ear: Tympanic membrane normal.   Eyes:      Conjunctiva/sclera: Conjunctivae normal.   Cardiovascular:      Rate and Rhythm: Normal rate and regular rhythm.      Heart sounds: Normal heart sounds. No murmur heard.  Pulmonary:      Effort: Pulmonary effort is normal. No respiratory distress.      Breath sounds: Normal breath sounds. No wheezing or rales.   Abdominal:      General: There is no distension.      Palpations: Abdomen is soft. There is no mass.      Tenderness: There is abdominal tenderness in the right upper quadrant, right lower quadrant and epigastric area. There is no guarding or rebound.   Musculoskeletal:      Cervical back: Neck supple.      Right lower leg: No edema.      Left lower leg: No edema.      Comments: Soft fixed mass " in the center of the back along the thoracic spine   Neurological:      Mental Status: He is alert and oriented to person, place, and time.   Psychiatric:         Mood and Affect: Mood normal.         Behavior: Behavior normal.         Thought Content: Thought content normal.         Judgment: Judgment normal.       Lea Reyes, MD

## 2024-04-13 LAB
HIV 1+2 AB+HIV1 P24 AG SERPL QL IA: NORMAL
HIV 2 AB SERPL QL IA: NORMAL
HIV1 AB SERPL QL IA: NORMAL
HIV1 P24 AG SERPL QL IA: NORMAL

## 2024-04-15 ENCOUNTER — TELEPHONE (OUTPATIENT)
Age: 49
End: 2024-04-15

## 2024-04-15 NOTE — TELEPHONE ENCOUNTER
"Pt called in asking for his lab results.     Gave pt Dr Reyes Message \"His cholesterol is much higher from last year. The rest of the blood work is normal\". Also gave Zenia YO message \"B12 level is normal\".     Pt had no further questions.   "

## 2024-04-17 LAB — VIT B1 BLD-SCNC: 129.6 NMOL/L (ref 66.5–200)

## 2024-06-19 ENCOUNTER — PATIENT MESSAGE (OUTPATIENT)
Dept: NEUROLOGY | Facility: CLINIC | Age: 49
End: 2024-06-19

## 2024-06-20 ENCOUNTER — PATIENT MESSAGE (OUTPATIENT)
Dept: NEUROLOGY | Facility: CLINIC | Age: 49
End: 2024-06-20

## 2024-06-20 DIAGNOSIS — R41.3 MEMORY CHANGE: Primary | ICD-10-CM

## 2024-06-20 DIAGNOSIS — F41.1 GENERALIZED ANXIETY DISORDER: ICD-10-CM

## 2024-06-20 NOTE — PATIENT COMMUNICATION
MSW phoned [facility]. They are not in network with [facility]     MSW phoned [facility] at   Address: [address] [place] [address] , [place] [address]  Hours: Open ? Closes 6?PM  Phone: [phone]  They do take [facility]   Counseling - within 2 weeks   Med management- within the next 2 months     MSW phoned pt at [phone] who informed that he is a current patient with   Darline Galicia Md in [facility]  Pt denied any suicidal/homicidal ideations. He is agreeable to receive information to connect with [facility] for counseling and med management.     Pt is aware to reach out to this writer for any questions or future social needs.

## 2024-06-25 NOTE — PATIENT COMMUNICATION
MSW phoned [facility]- pt has not scheduled appt yet. Pt received MH resources available.   MSW remains available for questions or future social needs

## 2024-06-28 ENCOUNTER — TELEPHONE (OUTPATIENT)
Dept: PSYCHIATRY | Facility: CLINIC | Age: 49
End: 2024-06-28

## 2024-06-28 NOTE — TELEPHONE ENCOUNTER
Contacted patient in regards to previous [facility] encounter requesting services in attempts to obtain preferences and potential scheduling. LVM for patient to contact intake dept in regards to referral

## 2024-07-05 NOTE — TELEPHONE ENCOUNTER
Contacted patient in regards to Routine Referral in attempts to verify patient's needs of services and add patient to proper wait list.  Writer LVM for patient to contact intake dept  in regards to referral and left callback number [phone]. Writer will offer resource guide if needed.    Final call attempt, closed, Unable to contact pt

## 2024-08-09 ENCOUNTER — OFFICE VISIT (OUTPATIENT)
Dept: INTERNAL MEDICINE CLINIC | Facility: CLINIC | Age: 49
End: 2024-08-09
Payer: COMMERCIAL

## 2024-08-09 VITALS
BODY MASS INDEX: 24.76 KG/M2 | DIASTOLIC BLOOD PRESSURE: 90 MMHG | HEART RATE: 66 BPM | WEIGHT: 167.2 LBS | HEIGHT: 69 IN | RESPIRATION RATE: 16 BRPM | TEMPERATURE: 97 F | OXYGEN SATURATION: 99 % | SYSTOLIC BLOOD PRESSURE: 110 MMHG

## 2024-08-09 DIAGNOSIS — F33.2 SEVERE EPISODE OF RECURRENT MAJOR DEPRESSIVE DISORDER, WITHOUT PSYCHOTIC FEATURES (HCC): Primary | ICD-10-CM

## 2024-08-09 DIAGNOSIS — F10.29 ALCOHOL DEPENDENCE WITH UNSPECIFIED ALCOHOL-INDUCED DISORDER (HCC): ICD-10-CM

## 2024-08-09 DIAGNOSIS — F41.1 GENERALIZED ANXIETY DISORDER: ICD-10-CM

## 2024-08-09 PROCEDURE — 99214 OFFICE O/P EST MOD 30 MIN: CPT | Performed by: INTERNAL MEDICINE

## 2024-08-09 RX ORDER — NALTREXONE HYDROCHLORIDE 50 MG/1
50 TABLET, FILM COATED ORAL DAILY
Start: 2024-08-09

## 2024-08-09 RX ORDER — LAMOTRIGINE 100 MG/1
100 TABLET, EXTENDED RELEASE ORAL
Start: 2024-08-09

## 2024-08-09 NOTE — PROGRESS NOTES
Ambulatory Visit  Name: Neil Abdi      : 1975      MRN: [id number]  Encounter Provider: Lea Reyes, MD  Encounter Date: 2024   [facility]    Assessment & Plan   1. Severe episode of recurrent major depressive disorder, without psychotic features (HCC)  Assessment & Plan:  FMLA form completed today starting  and returning to work   Continue follow up with psych and instructed to call for a sooner appointment  (not scheduled until September)  Continue seeing psychologist  He has been referred to neuropsychologist and will call back [facility] . He will see if he can see one sooner at [facility] or [facility]  Discussed going to the ER if he experiences worsening symptoms    Orders:  -     lamoTRIgine  MG TB24; Take 1 tablet (100 mg total) by mouth daily at bedtime  2. Generalized anxiety disorder  -     lamoTRIgine  MG TB24; Take 1 tablet (100 mg total) by mouth daily at bedtime  3. Alcohol dependence with unspecified alcohol-induced disorder (HCC)  -     naltrexone (REVIA) 50 mg tablet; Take 1 tablet (50 mg total) by mouth daily         History of Present Illness     Here for a follow up  Took a leave from work due to extreme fatigue, dizziness, blurry vision, poor memory, palpitations  Symptoms are chronic with normal tests  He was not convinced his symptoms were from anxiety and depression  He is diagnosed with anxiety and depression  Established with new psychiatrist ([facility], Dr Cano) 2 weeks ago and Lamictal changed to ER form at 100mg  Also met with the psychologist there  Neurology and psychiatry have referred him to neuropsych and he can get an appt at [facility] in October  He has been on leave since  and has paperwork to be completed  Lives alone,  but in good standing with his ex wife  Admits to sometimes having suicidal thoughts through carbon monoxide poisoning  Denies having guns at  home  He drinks alcohol and was prescribed naltrexone as well          Review of Systems   Constitutional:  Positive for fatigue.   Respiratory:  Positive for cough and shortness of breath.    Cardiovascular:  Positive for chest pain.   Gastrointestinal:  Positive for nausea. Negative for constipation and diarrhea.   Neurological:  Positive for dizziness and headaches.   Psychiatric/Behavioral:  Positive for dysphoric mood and sleep disturbance. The patient is nervous/anxious.      Past Medical History:   Diagnosis Date   • Anxiety    • COVID-19 virus infection 06/23/2022   • Depression    • Screen for STD (sexually transmitted disease) 02/28/2018   • Screening for diabetes mellitus 02/28/2018   • Screening, lipid 02/28/2018   • Wellness examination 02/28/2018     Past Surgical History:   Procedure Laterality Date   • ACHILLES TENDON REPAIR Right    • FRACTURE SURGERY      Tore achilles tendon     Family History   Problem Relation Age of Onset   • Colon cancer Mother    • Stroke Mother    • Lung cancer Father    • Diabetes Father    • Dementia Maternal Aunt    • Dementia Maternal Uncle    • Lung cancer Paternal Aunt    • Brain cancer Paternal Aunt    • Heart attack Maternal Grandfather      Social History     Tobacco Use   • Smoking status: Former     Current packs/day: 0.50     Average packs/day: 0.5 packs/day for 10.0 years (5.0 ttl pk-yrs)     Types: Cigarettes   • Smokeless tobacco: Former     Types: Chew   Vaping Use   • Vaping status: Never Used   Substance and Sexual Activity   • Alcohol use: Yes     Comment: About 2-5 daily   • Drug use: No   • Sexual activity: Not Currently     Partners: Female     Current Outpatient Medications on File Prior to Visit   Medication Sig   • ALPRAZolam (XANAX) 0.25 mg tablet Take 0.25 mg by mouth daily as needed   • Cholecalciferol (Vitamin D) 50 MCG (2000 UT) CAPS Take 2,000 Units by mouth in the morning   • citalopram (CeleXA) 20 mg tablet Take 20 mg by mouth daily   •  "cyclobenzaprine (FLEXERIL) 10 mg tablet Take 1 tab at bedtime as needed for muscle spasm   • acetaminophen (TYLENOL) 325 mg tablet Take 650 mg by mouth every 6 (six) hours as needed for mild pain   • vitamin B-12 (VITAMIN B-12) 1,000 mcg tablet Take by mouth daily     Allergies   Allergen Reactions   • Azithromycin Other (See Comments)     Immunization History   Administered Date(s) Administered   • COVID-19 MODERNA VACC 0.5 ML IM 03/26/2021, 04/23/2021, 12/02/2021   • INFLUENZA 11/17/2014, 10/16/2018, 09/25/2019, 09/09/2020   • Pneumococcal Polysaccharide PPV23 08/30/2019   • Td (adult), adsorbed 01/01/2011   • Tdap 07/15/2022     Objective     /90 (BP Location: Left arm, Patient Position: Sitting, Cuff Size: Standard)   Pulse 66   Temp (!) 97 °F (36.1 °C) (Tympanic)   Resp 16   Ht 5' 9\" (1.753 m)   Wt 75.8 kg (167 lb 3.2 oz)   SpO2 99%   BMI 24.69 kg/m²     Physical Exam  Vitals and nursing note reviewed.   Constitutional:       General: He is not in acute distress.     Appearance: He is well-developed.   Eyes:      Conjunctiva/sclera: Conjunctivae normal.   Pulmonary:      Effort: No respiratory distress.   Abdominal:      Palpations: Abdomen is soft.   Neurological:      Mental Status: He is alert.   Psychiatric:         Mood and Affect: Mood is depressed.         Speech: Speech normal.         Behavior: Behavior normal.         Cognition and Memory: Cognition normal.         "

## 2024-08-10 PROBLEM — F33.2 SEVERE EPISODE OF RECURRENT MAJOR DEPRESSIVE DISORDER, WITHOUT PSYCHOTIC FEATURES (HCC): Status: ACTIVE | Noted: 2024-08-10

## 2024-08-10 NOTE — ASSESSMENT & PLAN NOTE
FMLA form completed today starting 7/26 and returning to work 9/9  Continue follow up with psych and instructed to call for a sooner appointment  (not scheduled until September)  Continue seeing psychologist  He has been referred to neuropsychologist and will call back [facility] . He will see if he can see one sooner at [facility] or [facility]  Discussed going to the ER if he experiences worsening symptoms

## 2024-08-13 ENCOUNTER — OFFICE VISIT (OUTPATIENT)
Dept: NEUROLOGY | Facility: CLINIC | Age: 49
End: 2024-08-13
Payer: COMMERCIAL

## 2024-08-13 VITALS
TEMPERATURE: 99.6 F | OXYGEN SATURATION: 99 % | DIASTOLIC BLOOD PRESSURE: 66 MMHG | HEART RATE: 74 BPM | SYSTOLIC BLOOD PRESSURE: 128 MMHG | BODY MASS INDEX: 24.69 KG/M2 | WEIGHT: 166.7 LBS | HEIGHT: 69 IN

## 2024-08-13 DIAGNOSIS — G44.209 TENSION TYPE HEADACHE: ICD-10-CM

## 2024-08-13 DIAGNOSIS — R41.3 MEMORY CHANGE: Primary | ICD-10-CM

## 2024-08-13 PROCEDURE — 99214 OFFICE O/P EST MOD 30 MIN: CPT | Performed by: NURSE PRACTITIONER

## 2024-08-13 NOTE — ASSESSMENT & PLAN NOTE
Patient with likely tension type headaches that occur 1-2 times per week, are very mild and do not require treatment.  Recommend he start B2, magnesium, and co-Q10 to assist.

## 2024-08-13 NOTE — PATIENT INSTRUCTIONS
Will await cognitive testing results.    Will refer to OT for cognitive rehab    For headache: Recommend starting vitamin b2 200 mg daily-can urine fluorescent yellow and magnesium oxide 400-500 mg daily is a natural laxative.   CoQ10 as well

## 2024-08-13 NOTE — PROGRESS NOTES
Patient ID: Neil Abdi is a 49 y.o. male.    Assessment/Plan:    Tension type headache  Patient with likely tension type headaches that occur 1-2 times per week, are very mild and do not require treatment.  Recommend he start B2, magnesium, and co-Q10 to assist.    Memory change  Patient continues to note progression of his cognitive complaints.  He continues to report difficulty with concentration/focus, inattention, and brain fog.  He also reports worsening short-term memory.  He feels he if is confused more often.      In the past when he reported cognitive complaints his MRI brain was normal, MoCA testing was also normal.  His depression and anxiety is not currently well-controlled, he is following up with a new psychiatrist and therapist for treatment.  He is awaiting further evaluation with neuropsychology to further assess his deficits which will be helpful.  Again suggested he follow-up with occupational therapy for cognitive rehab, referral placed today.    Plan for follow-up in several months to review neuropsychology testing.  To contact the office sooner with any concerns or worsening symptoms.       Diagnoses and all orders for this visit:    Memory change  -     Ambulatory Referral to Occupational Therapy; Future    Tension type headache           Subjective:    HPI  Neil Abdi is a 48 y.o. male w/ PMH anxiety/depression who presents for follow up for multiple complaints.     Last office visit 3/2024 in which he noted memory complaints, likely in this setting of depression and anxiety wants to follow-up with neuropsychology.    Interval History:  He has neuropsych testing scheduled in oct.    He did have to take leave from work due to depression and memory complaints.  He does have a new psychiatrist-did change his lamotrigine was helpful at first.   He is seeing a counselor as well.     Short term memory is worse, he can forget what he ate in a day, walk into a room and forget what he went in  "there for.  Has difficulty following a conversation.   Have trouble writing up a letter for his daughter.   He feels like he is in a fog.   He feels very hard to focus and pay attention, will have to be more conscious when driving as is used to be more automatic.    He is not very motivated and does not feel like doing much.     His sleep varies, he can have difficulty staying asleep at times.     Headaches are ok-gets  dull headache 1-2 times a week at the top of the head. Rates 2/10. No symptoms with headaches.     No changes in numbness or tingling   No issues with walking or balance, no urinary incontinence.      B1, b12 normal -he has cut back on alcohol 0-2 drinks a day    prior work up:  MRI brain 2/2023: 1.  No acute infarction, intracranial hemorrhage or mass effect.  3/2023 spep/immunofixation Normal pattern. No monoclonal proteins detected  CMP normal , CK 57 , TSH 2.31, b12 428, MMA 74 CBC- normal, JUSTO-neg, vit d 30, , b6-normal , a1c 4.7, spep faint band with overall polyclonal pattern in the gamma region            Objective:    Blood pressure 128/66, pulse 74, temperature 99.6 °F (37.6 °C), temperature source Temporal, height 5' 9\" (1.753 m), weight 75.6 kg (166 lb 11.2 oz), SpO2 99%.    Physical Exam  Constitutional:       General: He is awake.   HENT:      Right Ear: Hearing normal.      Left Ear: Hearing normal.   Eyes:      General: Lids are normal.      Extraocular Movements: Extraocular movements intact.      Pupils: Pupils are equal, round, and reactive to light.   Neurological:      Mental Status: He is alert.      Deep Tendon Reflexes:      Reflex Scores:       Bicep reflexes are 2+ on the right side and 2+ on the left side.       Brachioradialis reflexes are 2+ on the right side and 2+ on the left side.       Patellar reflexes are 2+ on the right side and 2+ on the left side.       Achilles reflexes are 2+ on the right side and 2+ on the left side.  Psychiatric:         Speech: Speech " normal.         Neurological Exam  Mental Status  Awake and alert. Oriented only to person, place and situation. Orientation: Knew month, year, day of the week. Memory: 5/5 delayed recall. Speech is normal. Able to name objects. Follows complex commands.  MOCA 26/30 3/28/24.    Cranial Nerves  CN II: Visual fields full to confrontation.  CN III, IV, VI: Extraocular movements intact bilaterally. Normal lids and orbits bilaterally. Pupils equal round and reactive to light bilaterally.  CN V:  Right: Facial sensation is normal.  Left: Facial sensation is normal on the left.  CN VII:  Right: There is no facial weakness.  Left: There is no facial weakness.  CN VIII:  Right: Hearing is normal.  Left: Hearing is normal.  CN IX, X: Palate elevates symmetrically  CN XI:  Right: Trapezius strength is normal.  Left: Trapezius strength is normal.  CN XII: Tongue midline without atrophy or fasciculations.    Motor  Normal muscle bulk throughout.                                               Right                     Left  Neck flexion                           5                           Neck extension                      5                           Elbow flexion                         5                          5  Elbow extension                    5                          5  Wrist flexion                           5                          5  Wrist extension                      5                          5  Finger flexion                         5                          5  Finger abduction                    5                          5  Hip flexion                              5                          5  Knee flexion                           5                          5  Knee extension                      5                          5  Ankle inversion                      5                          5  Ankle eversion                       5                          5  Plantarflexion                         5                           5  Dorsiflexion                            5                          5    Sensory  Light touch is normal in upper and lower extremities. Pinprick is normal in upper and lower extremities. Temperature is normal in upper and lower extremities. Vibration is normal in upper and lower extremities. Proprioception is normal in upper and lower extremities.     Reflexes                                            Right                      Left  Brachioradialis                    2+                         2+  Biceps                                 2+                         2+  Patellar                                2+                         2+  Achilles                                2+                         2+    Coordination  Right: Finger-to-nose normal. Rapid alternating movement normal.Left: Finger-to-nose normal. Rapid alternating movement normal.    Gait  Casual gait is normal including stance, stride, and arm swing. Able to rise from chair without using arms.        ROS:    Review of Systems   Constitutional:  Positive for fatigue. Negative for appetite change and fever.   HENT: Negative.  Negative for hearing loss, tinnitus, trouble swallowing and voice change.    Eyes: Negative.  Negative for photophobia, pain and visual disturbance.   Respiratory: Negative.  Negative for shortness of breath.    Cardiovascular: Negative.  Negative for palpitations.   Gastrointestinal: Negative.  Negative for nausea and vomiting.   Endocrine: Negative.  Negative for cold intolerance.   Genitourinary: Negative.  Negative for dysuria, frequency and urgency.   Musculoskeletal:  Negative for back pain, gait problem, myalgias, neck pain and neck stiffness.   Skin: Negative.  Negative for rash.   Allergic/Immunologic: Negative.    Neurological:  Positive for dizziness and weakness (b/l legs). Negative for tremors, seizures, syncope, facial asymmetry, speech difficulty, light-headedness, numbness and headaches.    Hematological: Negative.  Does not bruise/bleed easily.   Psychiatric/Behavioral:  Positive for sleep disturbance (not sleeping). Negative for confusion and hallucinations.         Short term memory  Has a hard time concentrating  Has a hard forming words and sentences    All other systems reviewed and are negative.

## 2024-08-13 NOTE — ASSESSMENT & PLAN NOTE
Patient continues to note progression of his cognitive complaints.  He continues to report difficulty with concentration/focus, inattention, and brain fog.  He also reports worsening short-term memory.  He feels he if is confused more often.      In the past when he reported cognitive complaints his MRI brain was normal, MoCA testing was also normal.  His depression and anxiety is not currently well-controlled, he is following up with a new psychiatrist and therapist for treatment.  He is awaiting further evaluation with neuropsychology to further assess his deficits which will be helpful.  Again suggested he follow-up with occupational therapy for cognitive rehab, referral placed today.    Plan for follow-up in several months to review neuropsychology testing.  To contact the office sooner with any concerns or worsening symptoms.

## 2024-09-04 ENCOUNTER — OFFICE VISIT (OUTPATIENT)
Dept: INTERNAL MEDICINE CLINIC | Facility: CLINIC | Age: 49
End: 2024-09-04
Payer: COMMERCIAL

## 2024-09-04 ENCOUNTER — EVALUATION (OUTPATIENT)
Dept: OCCUPATIONAL THERAPY | Facility: CLINIC | Age: 49
End: 2024-09-04
Payer: COMMERCIAL

## 2024-09-04 VITALS
SYSTOLIC BLOOD PRESSURE: 118 MMHG | HEIGHT: 69 IN | BODY MASS INDEX: 25.06 KG/M2 | HEART RATE: 87 BPM | WEIGHT: 169.2 LBS | DIASTOLIC BLOOD PRESSURE: 68 MMHG | OXYGEN SATURATION: 97 %

## 2024-09-04 DIAGNOSIS — R41.3 MEMORY CHANGE: ICD-10-CM

## 2024-09-04 DIAGNOSIS — F33.2 SEVERE EPISODE OF RECURRENT MAJOR DEPRESSIVE DISORDER, WITHOUT PSYCHOTIC FEATURES (HCC): Primary | ICD-10-CM

## 2024-09-04 PROCEDURE — 96125 COGNITIVE TEST BY HC PRO: CPT

## 2024-09-04 PROCEDURE — 99213 OFFICE O/P EST LOW 20 MIN: CPT | Performed by: INTERNAL MEDICINE

## 2024-09-04 PROCEDURE — 97165 OT EVAL LOW COMPLEX 30 MIN: CPT

## 2024-09-04 NOTE — PROGRESS NOTES
OT Evaluation     Today's date: 2024  Patient name: Neil Abdi  : 1975  MRN: [id number]  Referring provider: Zenia Pace CRNP  Dx:   Encounter Diagnosis     ICD-10-CM    1. Memory change  R41.3 Ambulatory Referral to Occupational Therapy          Start Time: 1023  Stop Time: 1103  Total time in clinic (min): 40 minutes      POC expires Unit limit Auth Expiration date PT/OT/ST + Visit Limit?   10/30 BOMN No Auth Required BOMN                                             Visit/Unit Tracking  AUTH Status:  Date                         Used 1                         Remaining  7                          Duration in weeks: 8 weeks, 1x/week (per patient request)  Plan of care beginning date: 2024  Plan of care expiration date: 10/30/2024  PN due: 10/4/2024    Assessment  Impairments: attention deficits and activity limitations  Other impairment: impaired memory/attention, impaired executive functioning    Assessment details: Patient presents for OT evaluation reporting ongoing cognitive complaints over the past 3 years. Referred by neurology to outpatient rehabilitation for cognitive therapy to address noted symptoms. Of note, he does have difficulty with sleep as well as reports of depression/anxiety which we discussed all impact cognition. RBANs performed today; low average performance with immediate recall and language sub-sections. Educated on OT role in cognitive rehabilitation. Provided with cognitive HEP (brain games, puzzles, sudoku, reading). Recommend initiation of OT services to address complaints/deficits to maximize occupational performance in affected areas. Patient understands and agrees with recommended POC.  Understanding of Dx/Px/POC: good     Prognosis: good    Goals  STGs:  -Pt will be educated on and report good compliance with cognitive home program (brain games, puzzles, sudoku, reading).  -Pt will be educated on and report good carry over of internal and external  "compensatory strategies for memory and attention difficulty.  -Pt will be educated on and report good carry over of sleep hygiene recommendations.  -Pt will improve attention-to-task for up to 20 minutes without cognitive fatigue/mental fogginess.    LTGs:  -Pt will exhibit improved cognitive endurance with ability to work for up to 45 minutes without requiring rest break in clinic setting.  -Pt will exhibit improved memory with utilization of compensatory strategies for improved recall while engaging in ADLs/IADLs and work-related tasks.  -Pt will exhibit improved executive functioning performance throughout sessions and on objective measures for improved participation in driving and work-related tasks.  -Pt will report improvement regarding attention and recall as evidenced by improved performance on objective measures, and will report readiness to transition to [facility] for long-term management of symptoms.      Plan  Patient would benefit from: skilled occupational therapy    Planned therapy interventions: therapeutic activities, cognitive skills, home exercise program and graded activity  Other planned therapy interventions: sleep hygiene education, attention training (selective, alternating, dual), executive functioning, memory re-training (working, immediate, delayed), cognitive loading & exertion    Frequency: 1-2x week  Duration in weeks: 8  Plan of Care beginning date: 9/4/2024  Plan of Care expiration date: 10/30/2024  Treatment plan discussed with: patient        Subjective Evaluation    History of Present Illness  Mechanism of injury: Pt presents for OT evaluation with complaints of memory change. He was seen by Neurology and referred to OT to address these symptoms. Per Neurology note, patient continues to note progression of his cognitive complaints (difficulty with concentration/focus, inattention, and brain fog; worsening short-term memory). \"I can't think straight, my brain is all over the place.\"     " "  Prior MRI brain was normal, MoCA testing was also normal.  His depression and anxiety is not currently well-controlled, he is following up with a new psychiatrist and therapist for treatment.  He is awaiting further evaluation with neuropsychology (scheduled ) to further assess his deficits.    Admits to personal family issues. Mom passed away within the year, which has been hard on him.  from his wife. Trying to stay away from drinking, which he was previously engaging in regularly. Admits to wanting to isolate himself.     Pain  Current pain ratin  At best pain ratin  At worst pain rating: 3      Occupational Profile:    ADLs: Sleeping approximately 5-6 hours/night. Difficulty falling asleep, waking up with a lot of thoughts running through his head. \"Mind is racing.\"    IADLs: Feels in a fog when going through his day. Looking in the wrong places for household items.     Driving: Sometimes feeling \"spatially out of it\". Concentration sometimes is impaired. On days where symptoms are worse, will refrain from driving.    Work: Right now is on leave. 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 More forgetful. Taking longer to write up reports and write emails.       Objective      The Repeatable Battery for the Assessment of Neuropsychological Status (RBANS) is a brief, individually-administered assessment which measures attention, language, visuospatial/constructional abilities, and immediate & delayed memory. The RBANS is intended for use with adolescents to adults, ages 12 to 89 years. The following results were obtained during the administration of the assessment.     Form: A     Cognitive Domain/Subtest: Index Score: Percentile Rank: Classification:   IMMEDIATE MEMORY 90 25th%ile Low Average         1. List Learning ()        2. Story Memory ()       VISUOSPATIAL/  CONSTRUCTIONAL 109 73rd%ile Average        3. Figure Copy ()        4. Line " Orientation (18/20)       LANGUAGE 88 21st%ile Low Average        5. Picture Naming (10/10)        6. Semantic Fluency (15/40)       ATTENTION 109 73rd%ile Average        7. Digit Span (13/16)        8. Coding (51/89)       DELAYED MEMORY 100 50th%ile Average        9. List Recall (5/10)        10. List Recognition (20/20)        11. Story Recall (8/12)        12. Figure Recall (17/20)      Sum of Index Scores:     Total Score:  496   Percentile: 45th%ile   Classification: Average          Precautions: global

## 2024-09-04 NOTE — PROGRESS NOTES
Ambulatory Visit  Name: Neil Abdi      : 1975      MRN: [id number]  Encounter Provider: Lea Reyes, MD  Encounter Date: 2024   Encounter department: [facility]    Assessment & Plan   1. Severe episode of recurrent major depressive disorder, without psychotic features (HCC)  Follow up with psychologist, psychiatrist, OT and neuropsych  Form completed to return to work on 912         History of Present Illness     Here for a follow up  Needs form completed to return to work  He has been on leave for depression  Seeing psychologist, psychiatrist, OT  Scheduled for neuropsych evaluation  Continues to feel depressed, anxious but ready to return to work  He remains on Celexa 20mg but psych said dose might be raised  He was switched to the long acting Lamictal 100mg when his symptoms worsened. Unclear how much it has helped.   C/o dizziness that happens randomly        Review of Systems   Constitutional:  Positive for fatigue and unexpected weight change (weight gain).   Respiratory:  Positive for chest tightness and shortness of breath.    Cardiovascular:  Positive for chest pain and palpitations.   Gastrointestinal:  Positive for abdominal pain and nausea. Negative for constipation and diarrhea.   Genitourinary:  Negative for difficulty urinating.   Neurological:  Positive for dizziness, weakness and headaches.   Psychiatric/Behavioral:  Positive for sleep disturbance. Negative for hallucinations and suicidal ideas. The patient is nervous/anxious.      Past Medical History:   Diagnosis Date   • Anxiety    • COVID-19 virus infection 2022   • Depression    • Screen for STD (sexually transmitted disease) 2018   • Screening for diabetes mellitus 2018   • Screening, lipid 2018   • Wellness examination 2018     Past Surgical History:   Procedure Laterality Date   • ACHILLES TENDON REPAIR Right    • FRACTURE SURGERY      Tore achilles tendon     Family History    Problem Relation Age of Onset   • Colon cancer Mother    • Stroke Mother    • Lung cancer Father    • Diabetes Father    • Dementia Maternal Aunt    • Dementia Maternal Uncle    • Lung cancer Paternal Aunt    • Brain cancer Paternal Aunt    • Heart attack Maternal Grandfather      Social History     Tobacco Use   • Smoking status: Former     Current packs/day: 0.50     Average packs/day: 0.5 packs/day for 10.0 years (5.0 ttl pk-yrs)     Types: Cigarettes   • Smokeless tobacco: Former     Types: Chew   Vaping Use   • Vaping status: Never Used   Substance and Sexual Activity   • Alcohol use: Yes     Comment: About 2-5 daily   • Drug use: No   • Sexual activity: Not Currently     Partners: Female     Current Outpatient Medications on File Prior to Visit   Medication Sig   • acetaminophen (TYLENOL) 325 mg tablet Take 650 mg by mouth every 6 (six) hours as needed for mild pain   • ALPRAZolam (XANAX) 0.25 mg tablet Take 0.25 mg by mouth daily as needed   • Cholecalciferol (Vitamin D) 50 MCG (2000 UT) CAPS Take 2,000 Units by mouth in the morning   • citalopram (CeleXA) 20 mg tablet Take 20 mg by mouth daily   • vitamin B-12 (VITAMIN B-12) 1,000 mcg tablet Take by mouth daily   • cyclobenzaprine (FLEXERIL) 10 mg tablet Take 1 tab at bedtime as needed for muscle spasm (Patient not taking: Reported on 8/13/2024)   • lamoTRIgine  MG TB24 Take 1 tablet (100 mg total) by mouth daily at bedtime   • [DISCONTINUED] naltrexone (REVIA) 50 mg tablet Take 1 tablet (50 mg total) by mouth daily (Patient not taking: Reported on 8/13/2024)     Allergies   Allergen Reactions   • Azithromycin Other (See Comments)     Immunization History   Administered Date(s) Administered   • COVID-19 MODERNA VACC 0.5 ML IM 03/26/2021, 04/23/2021, 12/02/2021   • INFLUENZA 11/17/2014, 10/16/2018, 09/25/2019, 09/09/2020   • Pneumococcal Polysaccharide PPV23 08/30/2019   • Td (adult), adsorbed 01/01/2011   • Tdap 07/15/2022     Objective     /68  "(BP Location: Left arm, Patient Position: Sitting, Cuff Size: Standard)   Pulse 87   Ht 5' 9\" (1.753 m)   Wt 76.7 kg (169 lb 3.2 oz)   SpO2 97%   BMI 24.99 kg/m²     Physical Exam  Vitals and nursing note reviewed.   Constitutional:       General: He is not in acute distress.     Appearance: He is well-developed. He is not ill-appearing, toxic-appearing or diaphoretic.   Eyes:      Conjunctiva/sclera: Conjunctivae normal.   Cardiovascular:      Rate and Rhythm: Normal rate and regular rhythm.      Heart sounds: No murmur heard.  Pulmonary:      Effort: Pulmonary effort is normal. No respiratory distress.      Breath sounds: Normal breath sounds.   Abdominal:      Palpations: Abdomen is soft.      Tenderness: There is no abdominal tenderness.   Musculoskeletal:      Cervical back: Neck supple.   Neurological:      Mental Status: He is alert.      Cranial Nerves: No facial asymmetry.   Psychiatric:         Mood and Affect: Mood is depressed.         "

## 2024-09-11 ENCOUNTER — APPOINTMENT (OUTPATIENT)
Dept: OCCUPATIONAL THERAPY | Facility: CLINIC | Age: 49
End: 2024-09-11
Payer: COMMERCIAL

## 2024-09-18 ENCOUNTER — APPOINTMENT (OUTPATIENT)
Dept: OCCUPATIONAL THERAPY | Facility: CLINIC | Age: 49
End: 2024-09-18
Payer: COMMERCIAL

## 2024-09-25 ENCOUNTER — OFFICE VISIT (OUTPATIENT)
Dept: OCCUPATIONAL THERAPY | Facility: CLINIC | Age: 49
End: 2024-09-25
Payer: COMMERCIAL

## 2024-09-25 DIAGNOSIS — R41.3 MEMORY CHANGE: Primary | ICD-10-CM

## 2024-09-25 PROCEDURE — 97530 THERAPEUTIC ACTIVITIES: CPT

## 2024-09-25 NOTE — PROGRESS NOTES
Occupational Therapy Daily Note:    Today's date: 2024  Patient name: Neil Abdi  : 1975  MRN: [id number]  Referring provider: Zenia Pace CRNP  Dx:   Encounter Diagnosis   Name Primary?    Memory change Yes                POC expires Unit limit Auth Expiration date PT/OT/ST + Visit Limit?   10/30 BOMN No Auth Required BOMN                                             Visit/Unit Tracking  AUTH Status:  Date                       Used 1  2                       Remaining  7  6                        Duration in weeks: 8 weeks, 1x/week (per patient request)  Plan of care beginning date: 2024  Plan of care expiration date: 10/30/2024  PN due: 10/4/2024    Objective: See treatment below.     Thera Act:  Pt completing activity working on processing skills, working memory, delayed recall, STM, and immediate memory while seated in chair, completed A-Z multi matrix puzzle, sequencing steps, grab white piece, place on open space and then find same letter and move on top of corresponding color pieces. Pt naming fruits and vegetables for each letter. Pt difficulty to come up with 3 words requiring cues, as well as requiring same cues when recalling all words at the end of activity.     Assessment: Tolerated treatment well. Pt o be educated on memory strategies in future sessions. Increased time to complete activity this date. Patient would benefit from continued skilled OT.    Plan: Continued skilled OT per POC

## 2025-01-03 ENCOUNTER — OFFICE VISIT (OUTPATIENT)
Dept: INTERNAL MEDICINE CLINIC | Facility: CLINIC | Age: 50
End: 2025-01-03
Payer: COMMERCIAL

## 2025-01-03 VITALS
BODY MASS INDEX: 24.99 KG/M2 | DIASTOLIC BLOOD PRESSURE: 78 MMHG | HEIGHT: 69 IN | SYSTOLIC BLOOD PRESSURE: 122 MMHG | OXYGEN SATURATION: 96 % | HEART RATE: 72 BPM | TEMPERATURE: 97.1 F

## 2025-01-03 DIAGNOSIS — E55.9 VITAMIN D DEFICIENCY: ICD-10-CM

## 2025-01-03 DIAGNOSIS — Z00.00 ANNUAL PHYSICAL EXAM: Primary | ICD-10-CM

## 2025-01-03 DIAGNOSIS — F33.2 SEVERE EPISODE OF RECURRENT MAJOR DEPRESSIVE DISORDER, WITHOUT PSYCHOTIC FEATURES (HCC): ICD-10-CM

## 2025-01-03 DIAGNOSIS — Z00.00 LABORATORY EXAM ORDERED AS PART OF ROUTINE GENERAL MEDICAL EXAMINATION: ICD-10-CM

## 2025-01-03 DIAGNOSIS — Z13.220 SCREENING, LIPID: ICD-10-CM

## 2025-01-03 DIAGNOSIS — F41.1 GENERALIZED ANXIETY DISORDER: ICD-10-CM

## 2025-01-03 DIAGNOSIS — F10.29 ALCOHOL DEPENDENCE WITH UNSPECIFIED ALCOHOL-INDUCED DISORDER (HCC): ICD-10-CM

## 2025-01-03 DIAGNOSIS — Z12.11 SCREEN FOR COLON CANCER: ICD-10-CM

## 2025-01-03 PROCEDURE — 99396 PREV VISIT EST AGE 40-64: CPT | Performed by: INTERNAL MEDICINE

## 2025-01-03 RX ORDER — CITALOPRAM HYDROBROMIDE 10 MG/1
10 TABLET ORAL DAILY
Start: 2025-01-03

## 2025-01-03 RX ORDER — LAMOTRIGINE 100 MG/1
100 TABLET ORAL DAILY
Start: 2025-01-03

## 2025-01-03 NOTE — PROGRESS NOTES
Adult Annual Physical  Name: Neil Abdi      : 1975      MRN: [id number]  Encounter Provider: Lea Reyes, MD  Encounter Date: 1/3/2025   Encounter department: [facility]    Assessment & Plan  Annual physical exam         Severe episode of recurrent major depressive disorder, without psychotic features (HCC)  Depression Screening Follow-up Plan: Patient's depression screening was positive with a PHQ-9 score of 15. Continue regular follow-up with their psychologist/therapist/psychiatrist who is managing their mental health condition(s).  Neuropsychiatric testing did not show any neurologic disorder  Strongly encouraged to continue follow-up with psychiatry  Orders:  •  citalopram (CeleXA) 10 mg tablet; Take 1 tablet (10 mg total) by mouth daily  •  lamoTRIgine (LaMICtal) 100 mg tablet; Take 1 tablet (100 mg total) by mouth daily  •  Melatonin 5 MG TABS; Take 1 tablet (5 mg total) by mouth daily at bedtime as needed (sleep)    Generalized anxiety disorder    Orders:  •  citalopram (CeleXA) 10 mg tablet; Take 1 tablet (10 mg total) by mouth daily  •  lamoTRIgine (LaMICtal) 100 mg tablet; Take 1 tablet (100 mg total) by mouth daily  •  Melatonin 5 MG TABS; Take 1 tablet (5 mg total) by mouth daily at bedtime as needed (sleep)    Alcohol dependence with unspecified alcohol-induced disorder (HCC)  Discussed reducing alcohol intake       Screening, lipid    Orders:  •  Lipid panel; Future    Vitamin D deficiency    Orders:  •  Vitamin D 25 hydroxy; Future    Laboratory exam ordered as part of routine general medical examination    Orders:  •  CBC and differential; Future  •  Comprehensive metabolic panel; Future    Screen for colon cancer    Orders:  •  Ambulatory Referral to Gastroenterology; Future      Immunizations and preventive care screenings were discussed with patient today. Appropriate education was printed on patient's after visit summary.        Counseling:  Exercise: the  "importance of regular exercise/physical activity was discussed. Recommend exercise 3-5 times per week for at least 30 minutes.          History of Present Illness     Adult Annual Physical:  Patient presents for annual physical.     Diet and Physical Activity:  - Diet/Nutrition: poor diet.  - Exercise: no formal exercise.    Depression Screening:    - PHQ-9 Score: 15    General Health:    - Hearing: normal hearing bilateral ears.  - Vision: wears glasses and most recent eye exam > 1 year ago.  - Dental: no dental visits for > 1 year.     Health:    - Urinary symptoms: weak urinary stream.     Review of Systems   Constitutional:  Negative for unexpected weight change.   Respiratory:  Negative for cough and shortness of breath.    Cardiovascular:  Negative for chest pain and palpitations.   Gastrointestinal:  Positive for abdominal pain. Negative for constipation and diarrhea.   Psychiatric/Behavioral:  Positive for dysphoric mood.          Objective   /78 (BP Location: Left arm, Patient Position: Sitting, Cuff Size: Large)   Pulse 72   Temp (!) 97.1 °F (36.2 °C) (Tympanic)   Ht 5' 9\" (1.753 m)   SpO2 96%   BMI 24.99 kg/m²     Physical Exam  Constitutional:       Appearance: He is well-developed. He is not ill-appearing.   HENT:      Right Ear: Tympanic membrane and ear canal normal.      Left Ear: Tympanic membrane and ear canal normal.   Eyes:      Conjunctiva/sclera: Conjunctivae normal.   Cardiovascular:      Rate and Rhythm: Normal rate and regular rhythm.      Heart sounds: Normal heart sounds. No murmur heard.  Pulmonary:      Effort: Pulmonary effort is normal. No respiratory distress.      Breath sounds: Normal breath sounds. No wheezing or rales.   Abdominal:      General: There is no distension.      Palpations: Abdomen is soft. There is no mass.      Tenderness: There is no abdominal tenderness. There is no guarding or rebound.   Musculoskeletal:      Cervical back: Neck supple.      Right " lower leg: No edema.      Left lower leg: No edema.   Neurological:      Mental Status: He is alert and oriented to person, place, and time.   Psychiatric:         Mood and Affect: Mood is depressed.         Behavior: Behavior normal.         Thought Content: Thought content normal.         Judgment: Judgment normal.

## 2025-01-03 NOTE — PATIENT INSTRUCTIONS
"Patient Education     Routine physical for adults   The Basics   Written by the doctors and editors at [facility]   What is a physical? -- A physical is a routine visit, or \"check-up,\" with your doctor. You might also hear it called a \"wellness visit\" or \"preventive visit.\"  During each visit, the doctor will:   Ask about your physical and mental health   Ask about your habits, behaviors, and lifestyle   Do an exam   Give you vaccines if needed   Talk to you about any medicines you take   Give advice about your health   Answer your questions  Getting regular check-ups is an important part of taking care of your health. It can help your doctor find and treat any problems you have. But it's also important for preventing health problems.  A routine physical is different from a \"sick visit.\" A sick visit is when you see a doctor because of a health concern or problem. Since physicals are scheduled ahead of time, you can think about what you want to ask the doctor.  How often should I get a physical? -- It depends on your age and health. In general, for people age 21 years and older:   If you are younger than 50 years, you might be able to get a physical every 3 years.   If you are 50 years or older, your doctor might recommend a physical every year.  If you have an ongoing health condition, like diabetes or high blood pressure, your doctor will probably want to see you more often.  What happens during a physical? -- In general, each visit will include:   Physical exam - The doctor or nurse will check your height, weight, heart rate, and blood pressure. They will also look at your eyes and ears. They will ask about how you are feeling and whether you have any symptoms that bother you.   Medicines - It's a good idea to bring a list of all the medicines you take to each doctor visit. Your doctor will talk to you about your medicines and answer any questions. Tell them if you are having any side effects that bother you. You " "should also tell them if you are having trouble paying for any of your medicines.   Habits and behaviors - This includes:   Your diet   Your exercise habits   Whether you smoke, drink alcohol, or use drugs   Whether you are sexually active   Whether you feel safe at home  Your doctor will talk to you about things you can do to improve your health and lower your risk of health problems. They will also offer help and support. For example, if you want to quit smoking, they can give you advice and might prescribe medicines. If you want to improve your diet or get more physical activity, they can help you with this, too.   Lab tests, if needed - The tests you get will depend on your age and situation. For example, your doctor might want to check your:   Cholesterol   Blood sugar   Iron level   Vaccines - The recommended vaccines will depend on your age, health, and what vaccines you already had. Vaccines are very important because they can prevent certain serious or deadly infections.   Discussion of screening - \"Screening\" means checking for diseases or other health problems before they cause symptoms. Your doctor can recommend screening based on your age, risk, and preferences. This might include tests to check for:   Cancer, such as breast, prostate, cervical, ovarian, colorectal, prostate, lung, or skin cancer   Sexually transmitted infections, such as chlamydia and gonorrhea   Mental health conditions like depression and anxiety  Your doctor will talk to you about the different types of screening tests. They can help you decide which screenings to have. They can also explain what the results might mean.   Answering questions - The physical is a good time to ask the doctor or nurse questions about your health. If needed, they can refer you to other doctors or specialists, too.  Adults older than 65 years often need other care, too. As you get older, your doctor will talk to you about:   How to prevent falling at " home   Hearing or vision tests   Memory testing   How to take your medicines safely   Making sure that you have the help and support you need at home  All topics are updated as new evidence becomes available and our peer review process is complete.  This topic retrieved from [employer] on: May 02, 2024.  Topic 009487 Version 1.0  Release: 32.4.3 - C32.122  © 2024 UpToDate, Inc. and/or its affiliates. All rights reserved.  Consumer Information Use and Disclaimer   Disclaimer: This generalized information is a limited summary of diagnosis, treatment, and/or medication information. It is not meant to be comprehensive and should be used as a tool to help the user understand and/or assess potential diagnostic and treatment options. It does NOT include all information about conditions, treatments, medications, side effects, or risks that may apply to a specific patient. It is not intended to be medical advice or a substitute for the medical advice, diagnosis, or treatment of a health care provider based on the health care provider's examination and assessment of a patient's specific and unique circumstances. Patients must speak with a health care provider for complete information about their health, medical questions, and treatment options, including any risks or benefits regarding use of medications. This information does not endorse any treatments or medications as safe, effective, or approved for treating a specific patient. UpToDate, Inc. and its affiliates disclaim any warranty or liability relating to this information or the use thereof.The use of this information is governed by the Terms of Use, available at https://www.[facility] Centersuwer.com/en/know/clinical-effectiveness-terms. 2024© UpToDate, Inc. and its affiliates and/or licensors. All rights reserved.  Copyright   © 2024 UpToDate, Inc. and/or its affiliates. All rights reserved.

## 2025-01-03 NOTE — ASSESSMENT & PLAN NOTE
Depression Screening Follow-up Plan: Patient's depression screening was positive with a PHQ-9 score of 15. Continue regular follow-up with their psychologist/therapist/psychiatrist who is managing their mental health condition(s).  Neuropsychiatric testing did not show any neurologic disorder  Strongly encouraged to continue follow-up with psychiatry  Orders:  •  citalopram (CeleXA) 10 mg tablet; Take 1 tablet (10 mg total) by mouth daily  •  lamoTRIgine (LaMICtal) 100 mg tablet; Take 1 tablet (100 mg total) by mouth daily  •  Melatonin 5 MG TABS; Take 1 tablet (5 mg total) by mouth daily at bedtime as needed (sleep)

## 2025-01-03 NOTE — ASSESSMENT & PLAN NOTE
Orders:  •  citalopram (CeleXA) 10 mg tablet; Take 1 tablet (10 mg total) by mouth daily  •  lamoTRIgine (LaMICtal) 100 mg tablet; Take 1 tablet (100 mg total) by mouth daily  •  Melatonin 5 MG TABS; Take 1 tablet (5 mg total) by mouth daily at bedtime as needed (sleep)

## 2025-02-25 ENCOUNTER — HOSPITAL ENCOUNTER (EMERGENCY)
Facility: HOSPITAL | Age: 50
Discharge: HOME | End: 2025-02-25
Attending: EMERGENCY MEDICINE
Payer: COMMERCIAL

## 2025-02-25 VITALS
SYSTOLIC BLOOD PRESSURE: 128 MMHG | HEART RATE: 75 BPM | RESPIRATION RATE: 18 BRPM | OXYGEN SATURATION: 100 % | TEMPERATURE: 97.5 F | DIASTOLIC BLOOD PRESSURE: 82 MMHG

## 2025-02-25 DIAGNOSIS — R55 NEAR SYNCOPE: ICD-10-CM

## 2025-02-25 DIAGNOSIS — H43.399 FLOATERS: ICD-10-CM

## 2025-02-25 DIAGNOSIS — R53.83 FATIGUE: Primary | ICD-10-CM

## 2025-02-25 LAB
ALBUMIN SERPL BCG-MCNC: 4.4 G/DL (ref 3.5–5)
ALP SERPL-CCNC: 43 U/L (ref 34–104)
ALT SERPL W P-5'-P-CCNC: 21 U/L (ref 7–52)
ANION GAP SERPL CALCULATED.3IONS-SCNC: 9 MMOL/L (ref 4–13)
APAP SERPL-MCNC: <2 UG/ML (ref 10–20)
AST SERPL W P-5'-P-CCNC: 15 U/L (ref 13–39)
BASOPHILS # BLD AUTO: 0.03 THOUSANDS/ÂΜL (ref 0–0.1)
BASOPHILS NFR BLD AUTO: 1 % (ref 0–1)
BILIRUB SERPL-MCNC: 0.81 MG/DL (ref 0.2–1)
BUN SERPL-MCNC: 10 MG/DL (ref 5–25)
CALCIUM SERPL-MCNC: 9.3 MG/DL (ref 8.4–10.2)
CHLORIDE SERPL-SCNC: 102 MMOL/L (ref 96–108)
CO2 SERPL-SCNC: 24 MMOL/L (ref 21–32)
CREAT SERPL-MCNC: 1.08 MG/DL (ref 0.6–1.3)
EOSINOPHIL # BLD AUTO: 0.02 THOUSAND/ÂΜL (ref 0–0.61)
EOSINOPHIL NFR BLD AUTO: 0 % (ref 0–6)
ERYTHROCYTE [DISTWIDTH] IN BLOOD BY AUTOMATED COUNT: 11.7 % (ref 11.6–15.1)
ETHANOL SERPL-MCNC: <10 MG/DL
GFR SERPL CREATININE-BSD FRML MDRD: 81 ML/MIN/1.73SQ M
GLUCOSE SERPL-MCNC: 89 MG/DL (ref 65–140)
HCT VFR BLD AUTO: 42.1 % (ref 36.5–49.3)
HGB BLD-MCNC: 15 G/DL (ref 12–17)
IMM GRANULOCYTES # BLD AUTO: 0.02 THOUSAND/UL (ref 0–0.2)
IMM GRANULOCYTES NFR BLD AUTO: 0 % (ref 0–2)
LIPASE SERPL-CCNC: 24 U/L (ref 11–82)
LYMPHOCYTES # BLD AUTO: 0.98 THOUSANDS/ÂΜL (ref 0.6–4.47)
LYMPHOCYTES NFR BLD AUTO: 16 % (ref 14–44)
MAGNESIUM SERPL-MCNC: 2.1 MG/DL (ref 1.9–2.7)
MCH RBC QN AUTO: 33.2 PG (ref 26.8–34.3)
MCHC RBC AUTO-ENTMCNC: 35.6 G/DL (ref 31.4–37.4)
MCV RBC AUTO: 93 FL (ref 82–98)
MONOCYTES # BLD AUTO: 0.43 THOUSAND/ÂΜL (ref 0.17–1.22)
MONOCYTES NFR BLD AUTO: 7 % (ref 4–12)
NEUTROPHILS # BLD AUTO: 4.86 THOUSANDS/ÂΜL (ref 1.85–7.62)
NEUTS SEG NFR BLD AUTO: 76 % (ref 43–75)
NRBC BLD AUTO-RTO: 0 /100 WBCS
PLATELET # BLD AUTO: 220 THOUSANDS/UL (ref 149–390)
PMV BLD AUTO: 9.5 FL (ref 8.9–12.7)
POTASSIUM SERPL-SCNC: 4.5 MMOL/L (ref 3.5–5.3)
PROT SERPL-MCNC: 7 G/DL (ref 6.4–8.4)
RBC # BLD AUTO: 4.52 MILLION/UL (ref 3.88–5.62)
SALICYLATES SERPL-MCNC: <5 MG/DL (ref 3–20)
SODIUM SERPL-SCNC: 135 MMOL/L (ref 135–147)
WBC # BLD AUTO: 6.34 THOUSAND/UL (ref 4.31–10.16)

## 2025-02-25 PROCEDURE — 36415 COLL VENOUS BLD VENIPUNCTURE: CPT

## 2025-02-25 PROCEDURE — 80053 COMPREHEN METABOLIC PANEL: CPT

## 2025-02-25 PROCEDURE — 82077 ASSAY SPEC XCP UR&BREATH IA: CPT

## 2025-02-25 PROCEDURE — 80143 DRUG ASSAY ACETAMINOPHEN: CPT

## 2025-02-25 PROCEDURE — 80179 DRUG ASSAY SALICYLATE: CPT

## 2025-02-25 PROCEDURE — 99285 EMERGENCY DEPT VISIT HI MDM: CPT | Performed by: EMERGENCY MEDICINE

## 2025-02-25 PROCEDURE — 83690 ASSAY OF LIPASE: CPT

## 2025-02-25 PROCEDURE — 80175 DRUG SCREEN QUAN LAMOTRIGINE: CPT

## 2025-02-25 PROCEDURE — 85025 COMPLETE CBC W/AUTO DIFF WBC: CPT

## 2025-02-25 PROCEDURE — 99284 EMERGENCY DEPT VISIT MOD MDM: CPT

## 2025-02-25 PROCEDURE — 83735 ASSAY OF MAGNESIUM: CPT

## 2025-02-25 PROCEDURE — 93005 ELECTROCARDIOGRAM TRACING: CPT

## 2025-02-25 RX ORDER — ONDANSETRON 2 MG/ML
4 INJECTION INTRAMUSCULAR; INTRAVENOUS ONCE
Status: DISCONTINUED | OUTPATIENT
Start: 2025-02-25 | End: 2025-02-25 | Stop reason: HOSPADM

## 2025-02-25 RX ORDER — KETOROLAC TROMETHAMINE 30 MG/ML
15 INJECTION, SOLUTION INTRAMUSCULAR; INTRAVENOUS ONCE
Status: DISCONTINUED | OUTPATIENT
Start: 2025-02-25 | End: 2025-02-25 | Stop reason: HOSPADM

## 2025-02-25 NOTE — ED PROVIDER NOTES
"Time reflects when diagnosis was documented in both MDM as applicable and the Disposition within this note       Time User Action Codes Description Comment    2/25/2025  5:24 PM ChipNeil [R53.83] Fatigue     2/25/2025  5:24 PM Chip, Neil HOFFMANN Add [R55] Near syncope     2/25/2025  5:39 PM Chip, Neil HOFFMANN Add [H43.399] Floaters           ED Disposition       ED Disposition   Discharge    Condition   Stable    Date/Time   Tue Feb 25, 2025  5:24 PM    Comment   Neil AVINA Race discharge to home/self care.                   Assessment & Plan       Medical Decision Making  49-year-old male presenting due to acute worsening of chronic symptoms including nausea, lightheadedness, abdominal pain, headaches and fatigue.  Patient has been dealing with the symptoms for 6m to year but over the last couple days have gotten worse.  Patient has been feeling more tired with recurrent abdominal pain and earlier today he was at work and felt increasing tiredness, went to the bathroom and felt his heart fluttering and felt like he may pass out at that time.  She denies any chest pain or shortness of breath.  Has been eating and drinking normally.  Currently takes with the Celexa which she has been taking the same appropriately.  Patient also describes feeling \"mentally weird\".  Patient has had a cognitive test placed which was normal, there was concern for depression in the past.  Patient also states he has a floater in the left eye.    Physical exam unremarkable mild hypertension but vitals otherwise WNL.  Cranial nerves, motor sensation coordination all within normal limits.  For basic lab work, EKG, Toradol and Zofran for symptomatic treatment.  Patient already has a appointment scheduled with neurology for outpatient follow-up.    Amount and/or Complexity of Data Reviewed  Labs: ordered. Decision-making details documented in ED Course.    Risk  Prescription drug management.        ED Course as of 02/25/25 2326 Tue Feb " "25, 2025   1512 [place] confused couldn't concentrate, tired at work, almost passed out feeling lightheaded. Some nausea about a week. Pain in L abdomen, acid reflux. Mild headache.    Similar symptoms last 2 days.    \"1 yr feeling mentally weird\".  Normal cognitive test, possible depression.    No CP, SOB, V, diarrhea,     Current med- lamictal and celexa   1606 CBC and differential(!)  Unremarkable   1631 Comprehensive metabolic panel  WNL   1631 MAGNESIUM: 2.1  WNL   1632 LIPASE: 24  WNL   1632 Coma panel  WNL   1723 Workup unremarkable at this time.  Bedside ocular ultrasound performed due to patient having persistent eye, concerning for debris will have patient follow-up with ophthalmology. Plan for patient follow-up with his family doctor and neurology for monitoring of lamotrigine level as well as management of recurrent symptoms.  Patient is agreeable for discharge at this time.  Vitals remained stable throughout emergency department stay.  Return precautions discussed.       Medications - No data to display      ED Risk Strat Scores                                                History of Present Illness       Chief Complaint   Patient presents with    Dizziness     Pt reports dizziness, blurry vision, and memory issues for a while. Today at work pt started to have blurry vision, got tired, dizzy, and had a near syncopal episode. Ambulatory in triage. Reports having a neurologist appointment coming up.        Past Medical History:   Diagnosis Date    Anxiety     COVID-19 virus infection 06/23/2022    Depression     Screen for STD (sexually transmitted disease) 02/28/2018    Screening for diabetes mellitus 02/28/2018    Screening, lipid 02/28/2018    Wellness examination 02/28/2018      Past Surgical History:   Procedure Laterality Date    ACHILLES TENDON REPAIR Right     FRACTURE SURGERY      Tore achilles tendon      Family History   Problem Relation Age of Onset    Colon cancer Mother     Stroke Mother     " "Lung cancer Father     Diabetes Father     Dementia Maternal Aunt     Dementia Maternal Uncle     Lung cancer Paternal Aunt     Brain cancer Paternal Aunt     Heart attack Maternal Grandfather       Social History     Tobacco Use    Smoking status: Former     Current packs/day: 0.50     Average packs/day: 0.5 packs/day for 10.0 years (5.0 ttl pk-yrs)     Types: Cigarettes    Smokeless tobacco: Former     Types: Chew   Vaping Use    Vaping status: Never Used   Substance Use Topics    Alcohol use: Yes     Comment: About 2-5 daily    Drug use: No      E-Cigarette/Vaping    E-Cigarette Use Never User       E-Cigarette/Vaping Substances    Nicotine No     THC No     CBD No     Flavoring No     Other No     Unknown No       I have reviewed and agree with the history as documented.     49-year-old male presenting due to acute worsening of chronic symptoms including nausea, lightheadedness, abdominal pain, headaches and fatigue.  Patient has been dealing with the symptoms for 6m to year but over the last couple days have gotten worse.  Patient has been feeling more tired with recurrent abdominal pain and earlier today he was at work and felt increasing tiredness, went to the bathroom and felt his heart fluttering and felt like he may pass out at that time.  She denies any chest pain or shortness of breath.  Has been eating and drinking normally.  Currently takes with the Celexa which she has been taking the same appropriately.  Patient also describes feeling \"mentally weird\".  Patient has had a cognitive test placed which was normal, there was concern for depression in the past.        Review of Systems   Constitutional:  Positive for fatigue. Negative for chills and fever.   HENT:  Negative for ear pain and sore throat.    Eyes:  Positive for visual disturbance (floater). Negative for pain.   Respiratory:  Negative for cough and shortness of breath.    Cardiovascular:  Negative for chest pain and palpitations. "   Gastrointestinal:  Positive for abdominal pain and nausea. Negative for vomiting.   Genitourinary:  Negative for dysuria and hematuria.   Musculoskeletal:  Negative for arthralgias and back pain.   Skin:  Negative for color change and rash.   Neurological:  Positive for light-headedness and headaches. Negative for dizziness, seizures, syncope, facial asymmetry, speech difficulty, weakness and numbness.   All other systems reviewed and are negative.          Objective       ED Triage Vitals   Temperature Pulse Blood Pressure Respirations SpO2 Patient Position - Orthostatic VS   02/25/25 1454 02/25/25 1453 02/25/25 1453 02/25/25 1453 02/25/25 1453 02/25/25 1603   97.5 °F (36.4 °C) 75 (!) 154/106 18 98 % Lying      Temp Source Heart Rate Source BP Location FiO2 (%) Pain Score    02/25/25 1454 02/25/25 1453 02/25/25 1603 -- --    Oral Monitor Left arm        Vitals      Date and Time Temp Pulse SpO2 Resp BP Pain Score FACES Pain Rating User   02/25/25 1700 -- 75 100 % 18 128/82 -- -- EG   02/25/25 1603 -- 72 98 % 18 140/90 -- -- EG   02/25/25 1454 97.5 °F (36.4 °C) -- -- -- -- -- -- SM   02/25/25 1453 -- 75 98 % 18 154/106 -- -- SM            Physical Exam  Vitals and nursing note reviewed.   Constitutional:       General: He is not in acute distress.     Appearance: He is well-developed.   HENT:      Head: Normocephalic and atraumatic.      Right Ear: Tympanic membrane, ear canal and external ear normal.      Left Ear: Tympanic membrane, ear canal and external ear normal.      Nose: Nose normal. No congestion.      Mouth/Throat:      Pharynx: Oropharynx is clear.   Eyes:      Extraocular Movements: Extraocular movements intact.      Conjunctiva/sclera: Conjunctivae normal.   Cardiovascular:      Rate and Rhythm: Normal rate and regular rhythm.      Pulses: Normal pulses.      Heart sounds: Normal heart sounds. No murmur heard.  Pulmonary:      Effort: Pulmonary effort is normal. No respiratory distress.      Breath  sounds: Normal breath sounds.   Chest:      Chest wall: No tenderness.   Abdominal:      General: Abdomen is flat. Bowel sounds are normal. There is no distension.      Palpations: Abdomen is soft.      Tenderness: There is no abdominal tenderness. There is no right CVA tenderness or left CVA tenderness.   Musculoskeletal:         General: No deformity or signs of injury. Normal range of motion.      Cervical back: Normal range of motion and neck supple. No rigidity or tenderness.   Skin:     General: Skin is warm and dry.      Findings: No bruising, lesion or rash.   Neurological:      General: No focal deficit present.      Mental Status: He is alert and oriented to person, place, and time.      Cranial Nerves: No cranial nerve deficit.      Sensory: No sensory deficit.      Motor: No weakness.      Coordination: Coordination normal.         Results Reviewed       Procedure Component Value Units Date/Time    Acetaminophen level-If concentration is detectable, please discuss with medical  on call. [[id number]]  (Abnormal) Collected: 02/25/25 1537    Lab Status: Final result Specimen: Blood from Arm, Right Updated: 02/25/25 1648     Acetaminophen Level <2 ug/mL     Comprehensive metabolic panel [[id number]] Collected: 02/25/25 1537    Lab Status: Final result Specimen: Blood from Arm, Right Updated: 02/25/25 1629     Sodium 135 mmol/L      Potassium 4.5 mmol/L      Chloride 102 mmol/L      CO2 24 mmol/L      ANION GAP 9 mmol/L      BUN 10 mg/dL      Creatinine 1.08 mg/dL      Glucose 89 mg/dL      Calcium 9.3 mg/dL      AST 15 U/L      ALT 21 U/L      Alkaline Phosphatase 43 U/L      Total Protein 7.0 g/dL      Albumin 4.4 g/dL      Total Bilirubin 0.81 mg/dL      eGFR 81 ml/min/1.73sq m     Narrative:      [employer] guidelines for Chronic Kidney Disease (CKD):     Stage 1 with normal or high GFR (GFR > 90 mL/min/1.73 square meters)    Stage 2 Mild CKD (GFR = 60-89 mL/min/1.73  square meters)    Stage 3A Moderate CKD (GFR = 45-59 mL/min/1.73 square meters)    Stage 3B Moderate CKD (GFR = 30-44 mL/min/1.73 square meters)    Stage 4 Severe CKD (GFR = 15-29 mL/min/1.73 square meters)    Stage 5 End Stage CKD (GFR <15 mL/min/1.73 square meters)  Note: GFR calculation is accurate only with a steady state creatinine    Lipase [[id number]]  (Normal) Collected: 02/25/25 1537    Lab Status: Final result Specimen: Blood from Arm, Right Updated: 02/25/25 1629     Lipase 24 u/L     Magnesium [[id number]]  (Normal) Collected: 02/25/25 1537    Lab Status: Final result Specimen: Blood from Arm, Right Updated: 02/25/25 1629     Magnesium 2.1 mg/dL     Salicylate level [[id number]]  (Normal) Collected: 02/25/25 1537    Lab Status: Final result Specimen: Blood from Arm, Right Updated: 02/25/25 1629     Salicylate Lvl <5 mg/dL     Ethanol [[id number]]  (Normal) Collected: 02/25/25 1537    Lab Status: Final result Specimen: Blood from Arm, Right Updated: 02/25/25 1617     Ethanol Lvl <10 mg/dL     Lamotrigine level [[id number]] Collected: 02/25/25 1602    Lab Status: In process Specimen: Blood from Arm, Right Updated: 02/25/25 1608    CBC and differential [[id number]]  (Abnormal) Collected: 02/25/25 1537    Lab Status: Final result Specimen: Blood from Arm, Right Updated: 02/25/25 1550     WBC 6.34 Thousand/uL      RBC 4.52 Million/uL      Hemoglobin 15.0 g/dL      Hematocrit 42.1 %      MCV 93 fL      MCH 33.2 pg      MCHC 35.6 g/dL      RDW 11.7 %      MPV 9.5 fL      Platelets 220 Thousands/uL      nRBC 0 /100 WBCs      Segmented % 76 %      Immature Grans % 0 %      Lymphocytes % 16 %      Monocytes % 7 %      Eosinophils Relative 0 %      Basophils Relative 1 %      Absolute Neutrophils 4.86 Thousands/µL      Absolute Immature Grans 0.02 Thousand/uL      Absolute Lymphocytes 0.98 Thousands/µL      Absolute Monocytes 0.43 Thousand/µL      Eosinophils Absolute 0.02 Thousand/µL      Basophils Absolute 0.03  Thousands/µL             No orders to display       ECG 12 Lead Documentation Only    Date/Time: 2/25/2025 3:34 PM    Performed by: Neil Saunders MD  Authorized by: Neil Saunders MD    Patient location:  ED  Interpretation:     Interpretation: normal    Rate:     ECG rate:  68    ECG rate assessment: normal    Rhythm:     Rhythm: sinus rhythm    Ectopy:     Ectopy: none    QRS:     QRS axis:  Normal    QRS intervals:  Normal  Conduction:     Conduction: normal    ST segments:     ST segments:  Normal  T waves:     T waves: normal        ED Medication and Procedure Management   Prior to Admission Medications   Prescriptions Last Dose Informant Patient Reported? Taking?   ALPRAZolam (XANAX) 0.25 mg tablet  Self Yes No   Sig: Take 0.25 mg by mouth daily as needed   Cholecalciferol (Vitamin D) 50 MCG (2000 UT) CAPS  Self Yes No   Sig: Take 2,000 Units by mouth in the morning   Patient not taking: Reported on 1/3/2025   Melatonin 5 MG TABS   No No   Sig: Take 1 tablet (5 mg total) by mouth daily at bedtime as needed (sleep)   acetaminophen (TYLENOL) 325 mg tablet  Self Yes No   Sig: Take 650 mg by mouth every 6 (six) hours as needed for mild pain   citalopram (CeleXA) 10 mg tablet   No No   Sig: Take 1 tablet (10 mg total) by mouth daily   citalopram (CeleXA) 20 mg tablet  Self Yes No   Sig: Take 20 mg by mouth daily   cyclobenzaprine (FLEXERIL) 10 mg tablet  Self No No   Sig: Take 1 tab at bedtime as needed for muscle spasm   Patient not taking: Reported on 8/13/2024   lamoTRIgine (LaMICtal) 100 mg tablet   No No   Sig: Take 1 tablet (100 mg total) by mouth daily   vitamin B-12 (VITAMIN B-12) 1,000 mcg tablet  Self Yes No   Sig: Take by mouth daily      Facility-Administered Medications: None     Discharge Medication List as of 2/25/2025  5:39 PM        CONTINUE these medications which have NOT CHANGED    Details   acetaminophen (TYLENOL) 325 mg tablet Take 650 mg by mouth every 6 (six) hours as needed for mild pain,  Historical Med      ALPRAZolam (XANAX) 0.25 mg tablet Take 0.25 mg by mouth daily as needed, Starting Thu 2/16/2012, Historical Med      Cholecalciferol (Vitamin D) 50 MCG (2000 UT) CAPS Take 2,000 Units by mouth in the morning, Historical Med      !! citalopram (CeleXA) 10 mg tablet Take 1 tablet (10 mg total) by mouth daily, Starting Fri 1/3/2025, No Print      !! citalopram (CeleXA) 20 mg tablet Take 20 mg by mouth daily, Starting Sat 2/10/2018, Historical Med      cyclobenzaprine (FLEXERIL) 10 mg tablet Take 1 tab at bedtime as needed for muscle spasm, Normal      lamoTRIgine (LaMICtal) 100 mg tablet Take 1 tablet (100 mg total) by mouth daily, Starting Fri 1/3/2025, No Print      Melatonin 5 MG TABS Take 1 tablet (5 mg total) by mouth daily at bedtime as needed (sleep), Starting Fri 1/3/2025, No Print      vitamin B-12 (VITAMIN B-12) 1,000 mcg tablet Take by mouth daily, Historical Med       !! - Potential duplicate medications found. Please discuss with provider.          ED SEPSIS DOCUMENTATION   Time reflects when diagnosis was documented in both MDM as applicable and the Disposition within this note       Time User Action Codes Description Comment    2/25/2025  5:24 PM Neil Saunders [R53.83] Fatigue     2/25/2025  5:24 PM Neil Saunders [R55] Near syncope     2/25/2025  5:39 PM Neil Saunders [H43.399] Floaters                  Neil Saunders MD  02/25/25 [id number]

## 2025-02-25 NOTE — DISCHARGE INSTRUCTIONS
Please follow-up with neurology on your previously scheduled appointment for reassessment and management of symptoms.    Thank you for allowing us take part in your care.

## 2025-02-25 NOTE — ED PROCEDURE NOTE
Procedure  POC FAST US    Date/Time: 2/25/2025 5:37 PM    Performed by: Libby Spear MD  Authorized by: Libby Spear MD    Patient location:  ED  Procedure details:     Exam Type:  Educational    Indications comment:  Dizziness    Assess for:  Hemothorax, intra-abdominal fluid, pericardial effusion and pneumothorax    Technique: extended FAST      Views obtained:  Heart - Pericardial sac, LUQ - Splenorenal space, Suprapubic - Pouch of Giovanni, RUQ - Fu's Pouch, Left thorax and Right thorax    Image quality: non-diagnostic      Image availability:  Images available in PACS  FAST Findings:     RUQ (Hepatorenal) free fluid: absent      LUQ (Splenorenal) free fluid: absent      Suprapubic free fluid: absent      Cardiac wall motion: identified      Pericardial effusion: absent    extended FAST (Pulmonary) findings:     Left lung sliding: Present      Right lung sliding: Present      Left pleural effusion: Absent      Right pleural effusion: Absent    Interpretation:     Impressions: negative    POC Ocular US    Date/Time: 2/25/2025 5:39 PM    Performed by: Libby Spear MD  Authorized by: Libby Spear MD    Patient location:  ED  Performed by:  Resident  Procedure details:     Exam Type:  Educational    Indications: visual change and floaters      Assessment for: intraocular foreign body, retinal detachment, vitreous hemorrhage and lens dislocation      Eye(s) assessed:  Both eyes    Structures visualized: anterior chamber, posterior chamber, lens and optic nerve      Image quality: non-diagnostic      Image availability:  Images available in PACS  Left eye findings:     Foreign body: not identified      Retinal contour: normal      Lens: normal      Vitreous body: hyperechoic    Right eye findings:     Foreign body: not identified      Retinal contour: normal      Lens: normal      Vitreous body: anechoic    Interpretation:     Ocular US impressions: abnormal (see above)                     Libby  MD Rainer  02/25/25 [id number]

## 2025-02-25 NOTE — ED ATTENDING ATTESTATION
2/25/2025  IMaryam MD, saw and evaluated the patient. I have discussed the patient with the resident/non-physician practitioner and agree with the resident's/non-physician practitioner's findings, Plan of Care, and MDM as documented in the resident's/non-physician practitioner's note, except where noted. All available labs and Radiology studies were reviewed.  I was present for key portions of any procedure(s) performed by the resident/non-physician practitioner and I was immediately available to provide assistance.       At this point I agree with the current assessment done in the Emergency Department.  I have conducted an independent evaluation of this patient a history and physical is as follows:    This is a 49-year-old male patient with a relevant past medical history of depression and anxiety, presenting to the ED today for complaint of generalized malaise.  His symptoms have been present for the past 1 year.  Patient also had some blurry vision in his left eye, denies any other significantly associated symptoms.  He has not had any focal weakness, numbness or tingling, chest pain, shortness of breath, rashes lesions bruises, cough or congestion, chest pain pressure discomfort, or any other significantly associated symptoms.  His exam for the most part is unremarkable.  His differential diagnosis includes: Electrolyte abnormality versus alcohol intoxication versus infection versus other.  Patient had a CBC, metabolic panel, complete panel, magnesium, lipase all of which were unremarkable.  He does have a history of seizure disorder, is on Lamictal, Lamictal level was ordered however did not result at the time of disposition.  Patient will follow-up with his neurologist next week for further workup and management.  He has been seeing neurology as an outpatient, we will continue the workup with them.  The management plan was discussed in detail with the patient at bedside and all questions were  "answered. Strict ED return instructions were discussed at bedside. Prior to discharge, both verbal and written instructions were provided. We discussed the signs and symptoms that should prompt the patient to return to the ED. All questions were answered and the patient was comfortable with the plan of care and discharged home. The patient agrees to return to the Emergency Department for concerns and/or progression of illness.    Portions of the above record have been created with voice recognition software.  Occasional wrong word or \"sound alike\" substitutions may have occurred due to the inherent limitations of voice recognition software.  Read the chart carefully and recognize, using context, where substitutions may have occurred.    ED Course         Critical Care Time  Procedures      "

## 2025-02-27 LAB
ATRIAL RATE: 68 BPM
LAMOTRIGINE SERPL-MCNC: 3.4 UG/ML (ref 2–20)
P AXIS: 70 DEGREES
PR INTERVAL: 162 MS
QRS AXIS: 46 DEGREES
QRSD INTERVAL: 82 MS
QT INTERVAL: 420 MS
QTC INTERVAL: 446 MS
T WAVE AXIS: 58 DEGREES
VENTRICULAR RATE: 68 BPM

## 2025-02-27 PROCEDURE — 93010 ELECTROCARDIOGRAM REPORT: CPT | Performed by: INTERNAL MEDICINE

## 2025-03-04 ENCOUNTER — OFFICE VISIT (OUTPATIENT)
Dept: NEUROLOGY | Facility: CLINIC | Age: 50
End: 2025-03-04
Payer: COMMERCIAL

## 2025-03-04 VITALS
OXYGEN SATURATION: 97 % | TEMPERATURE: 98.8 F | DIASTOLIC BLOOD PRESSURE: 90 MMHG | SYSTOLIC BLOOD PRESSURE: 134 MMHG | HEART RATE: 77 BPM

## 2025-03-04 DIAGNOSIS — G44.209 TENSION TYPE HEADACHE: ICD-10-CM

## 2025-03-04 DIAGNOSIS — R41.3 MEMORY CHANGE: Primary | ICD-10-CM

## 2025-03-04 DIAGNOSIS — F41.1 GENERALIZED ANXIETY DISORDER: ICD-10-CM

## 2025-03-04 DIAGNOSIS — G25.81 RESTLESS LEG: ICD-10-CM

## 2025-03-04 DIAGNOSIS — R26.89 IMBALANCE: ICD-10-CM

## 2025-03-04 PROCEDURE — 99214 OFFICE O/P EST MOD 30 MIN: CPT | Performed by: NURSE PRACTITIONER

## 2025-03-04 NOTE — PATIENT INSTRUCTIONS
Follow up with counselor    Start magnesium supplement at bedtime.    Obtain EMG if balance issue persist   Obtain labs

## 2025-03-04 NOTE — PROGRESS NOTES
Name: Neil Abdi      : 1975      MRN: [id number]  Encounter Provider: SANAZ Urbina  Encounter Date: 3/4/2025   Encounter department: NEUROLOGY [facility]  :  Assessment & Plan  Imbalance  Notes ongoing imbalance for the past 2 years, notes intermittent weakness of the legs.  He also notes cramping of the legs at times.  He denies any back pain, does have some intermittent numbness and tingling.  MRI brain in the past with no significant findings.  Can check EMG due to intermittent leg weakness.  He has had normal CK and sed rate in the past.  Which is reassuring, advised to start magnesium supplement for muscle cramping.   Orders:    EMG 2 limb lower extremity; Future    Restless leg  Notes intermittent restless sensation in his legs, will check ferritin.  Orders:    Iron Panel (Includes Ferritin, Iron Sat%, Iron, and TIBC); Future    Generalized anxiety disorder  Likely contributing to his cognitive complaints.  He does follow with psych, he was agreeable to referral for counseling.  Orders:    Ambulatory referral to Psych Services; Future    Tension type headache  Improved and no longer occurring.       Memory change  Patient with ongoing cognitive complaints.  He continues to work difficulty with inattention, concentration, brain fog, short-term memory complaints.  He continues to function somewhat well at home.  He was again reassured with normal MRI brain and MoCA testing in the past.  He did recently have assessment with neuropsychology in which she had normal cognitive testing, felt cognitive complaints were likely due to anxiety, depression, obsessive tendencies and which we discussed.  He will continue regular follow-up with psych.  Did recommend considering counseling as well.             History of Present Illness   HPI   Neil Abdi is a 49 y.o. male w/ PMH anxiety/depression who presents for follow up for multiple complaints.     Last office visit 2024 in which  he was to follow up with neuro psych.     Interval History:  He did have testing with neuropsychology which did reveal intact cognitive functioning with no evidence of significant cognitive impairment.  Profile suggested cognitive symptoms were likely related to psychological factors, particularly depression, anxiety, obsessive-compulsive traits.  Testing did not support a neurodegenerative disorder or neurodevelopmental attentional disorder.  Continues to follow with psych did make some adjustments to his lamotrigine and celexa.  He feels his mood is baseline but he does feel depressed, detached.   He has seen counseling in the past but didn't felt he got much out of it but only tried on provider.   Sleep is not the greatest, wakes up several times a night.     For the past 2 years of imbalance that has been worsening over the past year. He feels his legs are weak intermittently. No falls. He feels his legs are tight and stiff at times, almost feels like a cramping is coming on, does feel restless of the legs at times happens occasionally day or night .   No back pain.   He is not very active.   Has numbness and tingling in the feet intermittently.      Headaches are ok-gets  dull headache several times a month Rates 2-3/10. No new symptoms with headaches.           B1, b12 normal -he has cut back on alcohol 0-2 drinks a day     prior work up:  MRI brain 2/2023: 1.  No acute infarction, intracranial hemorrhage or mass effect.  3/2023 spep/immunofixation Normal pattern. No monoclonal proteins detected  CMP normal , CK 57 , TSH 2.31, b12 428, MMA 74 CBC- normal, JUSTO-neg, vit d 30, , b6-normal , a1c 4.7, spep faint band with overall polyclonal pattern in the gamma region    Review of Systems   Constitutional:  Negative for appetite change, fatigue and fever.   HENT: Negative.  Negative for hearing loss, tinnitus, trouble swallowing and voice change.    Eyes:  Positive for visual disturbance (blurry vision). Negative  for photophobia and pain.   Respiratory: Negative.  Negative for shortness of breath.    Cardiovascular: Negative.  Negative for palpitations.   Gastrointestinal: Negative.  Negative for nausea and vomiting.   Endocrine: Negative.  Negative for cold intolerance.   Genitourinary: Negative.  Negative for dysuria, frequency and urgency.   Musculoskeletal:  Positive for gait problem (balance issues). Negative for back pain, myalgias, neck pain and neck stiffness.   Skin: Negative.  Negative for rash.   Allergic/Immunologic: Negative.    Neurological:  Positive for weakness (b/l legs). Negative for dizziness, tremors, seizures, syncope, facial asymmetry, speech difficulty, light-headedness, numbness and headaches.   Hematological: Negative.  Does not bruise/bleed easily.   Psychiatric/Behavioral: Negative.  Negative for confusion, hallucinations and sleep disturbance.    All other systems reviewed and are negative    I have personally reviewed the MA's review of systems and made changes as necessary.         Objective   There were no vitals taken for this visit.    Physical Exam  Constitutional:       General: He is awake.   HENT:      Right Ear: Hearing normal.      Left Ear: Hearing normal.   Eyes:      General: Lids are normal.      Extraocular Movements: Extraocular movements intact.      Pupils: Pupils are equal, round, and reactive to light.   Neurological:      Mental Status: He is alert.      Deep Tendon Reflexes:      Reflex Scores:       Bicep reflexes are 2+ on the right side and 2+ on the left side.       Brachioradialis reflexes are 2+ on the right side and 2+ on the left side.       Patellar reflexes are 2+ on the right side and 2+ on the left side.       Achilles reflexes are 2+ on the right side and 2+ on the left side.  Psychiatric:         Speech: Speech normal.       Neurological Exam  Mental Status  Awake and alert. Oriented only to person, place and situation. Orientation: Knew month, year, day of  the week. Speech is normal. Follows complex commands.  MOCA 26/30 3/28/24.    Cranial Nerves  CN II: Visual fields full to confrontation.  CN III, IV, VI: Extraocular movements intact bilaterally. Normal lids and orbits bilaterally. Pupils equal round and reactive to light bilaterally.  CN V:  Right: Facial sensation is normal.  Left: Facial sensation is normal on the left.  CN VII:  Right: There is no facial weakness.  Left: There is no facial weakness.  CN VIII:  Right: Hearing is normal.  Left: Hearing is normal.  CN IX, X: Palate elevates symmetrically  CN XI:  Right: Trapezius strength is normal.  Left: Trapezius strength is normal.  CN XII: Tongue midline without atrophy or fasciculations.    Motor  Normal muscle bulk throughout.                                               Right                     Left  Elbow flexion                         5                          5  Elbow extension                    5                          5  Wrist flexion                           5                          5  Wrist extension                      5                          5  Finger flexion                         5                          5  Finger abduction                    5                          5  Hip flexion                              5                          5  Knee flexion                           5                          5  Knee extension                      5                          5  Ankle inversion                      5                          5  Ankle eversion                       5                          5  Plantarflexion                         5                          5  Dorsiflexion                            5                          5    Sensory  Light touch is normal in upper and lower extremities. Pinprick is normal in upper and lower extremities. Temperature is normal in upper and lower extremities. Vibration is normal in upper and lower extremities. Proprioception is  normal in upper and lower extremities.     Reflexes                                            Right                      Left  Brachioradialis                    2+                         2+  Biceps                                 2+                         2+  Patellar                                2+                         2+  Achilles                                2+                         2+    Coordination  Right: Finger-to-nose normal. Rapid alternating movement normal.Left: Finger-to-nose normal. Rapid alternating movement normal.    Gait  Casual gait is normal including stance, stride, and arm swing. Able to rise from chair without using arms.

## 2025-03-04 NOTE — ASSESSMENT & PLAN NOTE
Likely contributing to his cognitive complaints.  He does follow with psych, he was agreeable to referral for counseling.  Orders:    Ambulatory referral to Psych Services; Future

## 2025-03-04 NOTE — PROGRESS NOTES
Review of Systems   Constitutional:  Negative for appetite change, fatigue and fever.   HENT: Negative.  Negative for hearing loss, tinnitus, trouble swallowing and voice change.    Eyes:  Positive for visual disturbance (blurry vision). Negative for photophobia and pain.   Respiratory: Negative.  Negative for shortness of breath.    Cardiovascular: Negative.  Negative for palpitations.   Gastrointestinal: Negative.  Negative for nausea and vomiting.   Endocrine: Negative.  Negative for cold intolerance.   Genitourinary: Negative.  Negative for dysuria, frequency and urgency.   Musculoskeletal:  Positive for gait problem (balance issues). Negative for back pain, myalgias, neck pain and neck stiffness.   Skin: Negative.  Negative for rash.   Allergic/Immunologic: Negative.    Neurological:  Positive for weakness (b/l legs). Negative for dizziness, tremors, seizures, syncope, facial asymmetry, speech difficulty, light-headedness, numbness and headaches.   Hematological: Negative.  Does not bruise/bleed easily.   Psychiatric/Behavioral: Negative.  Negative for confusion, hallucinations and sleep disturbance.    All other systems reviewed and are negative.

## 2025-03-05 NOTE — ASSESSMENT & PLAN NOTE
Patient with ongoing cognitive complaints.  He continues to work difficulty with inattention, concentration, brain fog, short-term memory complaints.  He continues to function somewhat well at home.  He was again reassured with normal MRI brain and MoCA testing in the past.  He did recently have assessment with neuropsychology in which she had normal cognitive testing, felt cognitive complaints were likely due to anxiety, depression, obsessive tendencies and which we discussed.  He will continue regular follow-up with psych.  Did recommend considering counseling as well.

## 2025-03-20 ENCOUNTER — TELEPHONE (OUTPATIENT)
Age: 50
End: 2025-03-20

## 2025-06-06 ENCOUNTER — OFFICE VISIT (OUTPATIENT)
Dept: URGENT CARE | Facility: CLINIC | Age: 50
End: 2025-06-06
Payer: COMMERCIAL

## 2025-06-06 VITALS
HEART RATE: 81 BPM | OXYGEN SATURATION: 99 % | HEIGHT: 69 IN | RESPIRATION RATE: 16 BRPM | BODY MASS INDEX: 25.18 KG/M2 | DIASTOLIC BLOOD PRESSURE: 70 MMHG | SYSTOLIC BLOOD PRESSURE: 108 MMHG | TEMPERATURE: 97.9 F | WEIGHT: 170 LBS

## 2025-06-06 DIAGNOSIS — L23.9 ALLERGIC CONTACT DERMATITIS, UNSPECIFIED TRIGGER: Primary | ICD-10-CM

## 2025-06-06 PROCEDURE — G0382 LEV 3 HOSP TYPE B ED VISIT: HCPCS

## 2025-06-06 PROCEDURE — S9083 URGENT CARE CENTER GLOBAL: HCPCS

## 2025-06-06 RX ORDER — LORATADINE 10 MG/1
10 TABLET ORAL DAILY
Qty: 20 TABLET | Refills: 0 | Status: SHIPPED | OUTPATIENT
Start: 2025-06-06

## 2025-06-06 RX ORDER — PREDNISONE 10 MG/1
TABLET ORAL
Qty: 20 TABLET | Refills: 0 | Status: SHIPPED | OUTPATIENT
Start: 2025-06-06

## 2025-06-06 NOTE — LETTER
June 6, 2025     Patient: Neil Abdi   YOB: 1975   Date of Visit: 6/6/2025       To Whom It May Concern:    It is my medical opinion that Neil Abdi may return to work on 6/8/2025.    If you have any questions or concerns, please don't hesitate to call.         Sincerely,        KATJA KEYS    CC: No Recipients

## 2025-06-06 NOTE — PROGRESS NOTES
"[facility]        NAME: Neil AVINA Race is a 50 y.o. male  : 1975    MRN: [id number]  DATE: 2025  TIME: 1:07 PM    /70   Pulse 81   Temp 97.9 °F (36.6 °C)   Resp 16   Ht 5' 9\" (1.753 m)   Wt 77.1 kg (170 lb)   SpO2 99%   BMI 25.10 kg/m²     Assessment and Plan   Allergic contact dermatitis, unspecified trigger [L23.9]  1. Allergic contact dermatitis, unspecified trigger  loratadine (CLARITIN) 10 mg tablet    predniSONE 10 mg tablet            Patient Instructions       Follow up with PCP in 3-5 days.  Proceed to  ER if symptoms worsen.    Chief Complaint     Chief Complaint   Patient presents with    Rash     Generalized rash started yesterday.          History of Present Illness       Pt with itching rash to arms and torso and face     Rash        Review of Systems   Review of Systems   Constitutional: Negative.    HENT: Negative.     Eyes: Negative.    Respiratory: Negative.     Cardiovascular: Negative.    Gastrointestinal: Negative.    Endocrine: Negative.    Genitourinary: Negative.    Musculoskeletal: Negative.    Skin:  Positive for rash.   Allergic/Immunologic: Negative.    Neurological: Negative.    Hematological: Negative.    Psychiatric/Behavioral: Negative.     All other systems reviewed and are negative.        Current Medications     Current Medications[1]    Current Allergies     Allergies as of 2025 - Reviewed 2025   Allergen Reaction Noted    Azithromycin Other (See Comments) 10/24/2014            The following portions of the patient's history were reviewed and updated as appropriate: allergies, current medications, past family history, past medical history, past social history, past surgical history and problem list.     Past Medical History[2]    Past Surgical History[3]    Family History[4]      Medications have been verified.        Objective   /70   Pulse 81   Temp 97.9 °F (36.6 °C)   Resp 16   Ht 5' 9\" (1.753 m)   Wt 77.1 kg (170 lb)  "  SpO2 99%   BMI 25.10 kg/m²        Physical Exam     Physical Exam  Vitals and nursing note reviewed.   Constitutional:       Appearance: Normal appearance. He is normal weight.      Comments: Pt with some minor dizziness and lightheadedness earlier none now,  pt declines finger stick glucose and denies EKG now in office , not dizzy now    HENT:      Head: Normocephalic and atraumatic.      Right Ear: Tympanic membrane, ear canal and external ear normal.      Left Ear: Tympanic membrane, ear canal and external ear normal.      Nose: Nose normal.      Mouth/Throat:      Mouth: Mucous membranes are moist.      Pharynx: Oropharynx is clear.     Eyes:      Extraocular Movements: Extraocular movements intact.      Conjunctiva/sclera: Conjunctivae normal.      Pupils: Pupils are equal, round, and reactive to light.       Cardiovascular:      Rate and Rhythm: Normal rate and regular rhythm.      Pulses: Normal pulses.      Heart sounds: Normal heart sounds.   Pulmonary:      Effort: Pulmonary effort is normal.      Breath sounds: Normal breath sounds.   Abdominal:      Palpations: Abdomen is soft.     Musculoskeletal:         General: Normal range of motion.      Cervical back: Normal range of motion and neck supple.     Skin:     General: Skin is warm.      Comments: Maculopapuilar rash no palms no soles, palate and pharynx wnl + blanching          Neurological:      Mental Status: He is alert and oriented to person, place, and time.                          [1]   Current Outpatient Medications:     loratadine (CLARITIN) 10 mg tablet, Take 1 tablet (10 mg total) by mouth daily, Disp: 20 tablet, Rfl: 0    predniSONE 10 mg tablet, 4 tabs po qd x 2 days then 3 tabs po qd x 2 days then 2 tabs po qd x 2 days then 1 tab po qd x 2 days, Disp: 20 tablet, Rfl: 0    acetaminophen (TYLENOL) 325 mg tablet, Take 650 mg by mouth every 6 (six) hours as needed for mild pain, Disp: , Rfl:     ALPRAZolam (XANAX) 0.25 mg tablet, Take 0.25  mg by mouth daily as needed, Disp: , Rfl:     Cholecalciferol (Vitamin D) 50 MCG (2000 UT) CAPS, Take 2,000 Units by mouth in the morning (Patient not taking: Reported on 3/4/2025), Disp: , Rfl:     citalopram (CeleXA) 10 mg tablet, Take 1 tablet (10 mg total) by mouth daily, Disp: , Rfl:     citalopram (CeleXA) 20 mg tablet, Take 20 mg by mouth daily, Disp: , Rfl: 2    cyclobenzaprine (FLEXERIL) 10 mg tablet, Take 1 tab at bedtime as needed for muscle spasm (Patient not taking: Reported on 8/13/2024), Disp: 20 tablet, Rfl: 0    lamoTRIgine (LaMICtal) 100 mg tablet, Take 1 tablet (100 mg total) by mouth daily, Disp: , Rfl:     Melatonin 5 MG TABS, Take 1 tablet (5 mg total) by mouth daily at bedtime as needed (sleep), Disp: , Rfl:     vitamin B-12 (VITAMIN B-12) 1,000 mcg tablet, Take by mouth daily, Disp: , Rfl:   [2]   Past Medical History:  Diagnosis Date    Anxiety     COVID-19 virus infection 06/23/2022    Depression     Screen for STD (sexually transmitted disease) 02/28/2018    Screening for diabetes mellitus 02/28/2018    Screening, lipid 02/28/2018    Wellness examination 02/28/2018   [3]   Past Surgical History:  Procedure Laterality Date    ACHILLES TENDON REPAIR Right     FRACTURE SURGERY      Tore achilles tendon   [4]   Family History  Problem Relation Name Age of Onset    Colon cancer Mother LR     Stroke Mother LR     Lung cancer Father JR     Diabetes Father JR     Dementia Maternal Aunt      Dementia Maternal Uncle      Lung cancer Paternal Aunt      Brain cancer Paternal Aunt      Heart attack Maternal Grandfather

## 2025-06-18 ENCOUNTER — TELEPHONE (OUTPATIENT)
Dept: NEUROLOGY | Facility: CLINIC | Age: 50
End: 2025-06-18

## 2025-06-18 NOTE — TELEPHONE ENCOUNTER
LMOM to r/ spt's appt w/ Katelynn due to provider leaving dept.Offered OVL with Nya Pena in [place] on 9/19/25 at 12 or 2.

## 2025-06-19 NOTE — TELEPHONE ENCOUNTER
LMOM to r/ spt's appt w/ Katelynn due to provider leaving dept.Offered OVL with Nya Pena in [place] on 9/19/25 at 12 or 2.

## 2025-07-10 ENCOUNTER — OFFICE VISIT (OUTPATIENT)
Dept: URGENT CARE | Facility: CLINIC | Age: 50
End: 2025-07-10
Payer: COMMERCIAL

## 2025-07-10 ENCOUNTER — NURSE TRIAGE (OUTPATIENT)
Age: 50
End: 2025-07-10

## 2025-07-10 VITALS
OXYGEN SATURATION: 99 % | SYSTOLIC BLOOD PRESSURE: 136 MMHG | HEIGHT: 69 IN | DIASTOLIC BLOOD PRESSURE: 86 MMHG | TEMPERATURE: 98.1 F | HEART RATE: 70 BPM | WEIGHT: 175 LBS | RESPIRATION RATE: 20 BRPM | BODY MASS INDEX: 25.92 KG/M2

## 2025-07-10 DIAGNOSIS — H69.92 DISORDER OF LEFT EUSTACHIAN TUBE: Primary | ICD-10-CM

## 2025-07-10 PROCEDURE — G0382 LEV 3 HOSP TYPE B ED VISIT: HCPCS | Performed by: NURSE PRACTITIONER

## 2025-07-10 PROCEDURE — S9083 URGENT CARE CENTER GLOBAL: HCPCS | Performed by: NURSE PRACTITIONER

## 2025-07-10 NOTE — TELEPHONE ENCOUNTER
Regarding: unable to hear left ear and ringing in ear  ----- Message from Larisa PETERSON sent at 7/10/2025 11:30 AM EDT -----  Hi. I woke up this morning and I can't hear out of my left ear at all. It's ringing too. It doesn't feel like wax. Doesn't seem to be getting better. Should I maybe just go to urgent care?     Thanks

## 2025-07-10 NOTE — PROGRESS NOTES
"[facility]  Name: Neil Abdi      : 1975      MRN: [id number]  Encounter Provider: SANAZ García  Encounter Date: 7/10/2025   Encounter department: [facility]  :  Assessment & Plan  Disorder of left eustachian tube           Patient Instructions  Flonase 1 spray each nostril daily.  Pseudoephedrine 1-2 tablets every 6 hours as needed for congestion.  Increase your fluid intake.  Tylenol and/or Motrin as needed for pain or fever.  Follow up with your PCP for worsening or concerning symptoms  Follow up with PCP in 3-5 days.  Proceed to  ER if symptoms worsen.    If tests are performed, our office will contact you with results only if changes need to made to the care plan discussed with you at the visit. [facility]    Chief Complaint:   Chief Complaint   Patient presents with   • Ear Fullness     PATIENT REPORTED HEARING LOSS STARTED THIS MORNING IN LEFT EAR,ALSO FEELS DIZZY AND \"FUNKY\" AND PRESSURE ON LEFT SIDE OF HEAD     History of Present Illness   Patient is a 50-year-old male presenting with left ear fullness, decreased hearing, and a \"funky\" feeling that started today..  Denies fevers or chills.  Denies drainage from the ear.  No over-the-counter medications attempted.  No associated URI symptoms.    Ear Fullness   Pertinent negatives include no coughing, ear discharge, rhinorrhea or sore throat.         Review of Systems   Constitutional:  Negative for activity change, chills and fever.   HENT:  Positive for ear pain. Negative for congestion, ear discharge, rhinorrhea and sore throat.    Respiratory:  Negative for cough.      Past Medical History   Past Medical History[1]  Past Surgical History[2]  Family History[3]  he reports that he has quit smoking. His smoking use included cigarettes. He has a 5 pack-year smoking history. He has quit using smokeless tobacco.  His smokeless tobacco use included chew. He reports " "current alcohol use. He reports that he does not use drugs.  Current Outpatient Medications   Medication Instructions   • acetaminophen (TYLENOL) 650 mg, Every 6 hours PRN   • ALPRAZolam (XANAX) 0.25 mg, Daily PRN   • citalopram (CELEXA) 20 mg, Daily   • citalopram (CELEXA) 10 mg, Oral, Daily   • cyclobenzaprine (FLEXERIL) 10 mg tablet Take 1 tab at bedtime as needed for muscle spasm   • lamoTRIgine (LAMICTAL) 100 mg, Oral, Daily   • loratadine (CLARITIN) 10 mg, Oral, Daily   • Melatonin 5 mg, Oral, Daily at bedtime PRN   • predniSONE 10 mg tablet 4 tabs po qd x 2 days then 3 tabs po qd x 2 days then 2 tabs po qd x 2 days then 1 tab po qd x 2 days   • vitamin B-12 (VITAMIN B-12) 1,000 mcg tablet Daily   • Vitamin D 2,000 Units, Daily   Allergies[4]     Objective   /86   Pulse 70   Temp 98.1 °F (36.7 °C)   Resp 20   Ht 5' 9\" (1.753 m)   Wt 79.4 kg (175 lb)   SpO2 99%   BMI 25.84 kg/m²      Physical Exam  Vitals reviewed.   Constitutional:       General: He is awake. He is not in acute distress.     Appearance: Normal appearance. He is normal weight.   HENT:      Head: Normocephalic.      Right Ear: Tympanic membrane, ear canal and external ear normal.      Left Ear: Ear canal and external ear normal. A middle ear effusion is present.      Nose: Nose normal.      Mouth/Throat:      Lips: Pink.      Pharynx: Oropharynx is clear.     Cardiovascular:      Rate and Rhythm: Normal rate.   Pulmonary:      Effort: Pulmonary effort is normal.     Skin:     General: Skin is warm and moist.     Neurological:      General: No focal deficit present.      Mental Status: He is alert and oriented to person, place, and time.     Psychiatric:         Behavior: Behavior is cooperative.         Portions of the record may have been created with voice recognition software.  Occasional wrong word or \"sound a like\" substitutions may have occurred due to the inherent limitations of voice recognition software.  Read the chart " carefully and recognize, using context, where substitutions have occurred.         [1]  Past Medical History:  Diagnosis Date   • Anxiety    • COVID-19 virus infection 06/23/2022   • Depression    • Screen for STD (sexually transmitted disease) 02/28/2018   • Screening for diabetes mellitus 02/28/2018   • Screening, lipid 02/28/2018   • Wellness examination 02/28/2018   [2]  Past Surgical History:  Procedure Laterality Date   • ACHILLES TENDON REPAIR Right    • FRACTURE SURGERY      Tore achilles tendon   [3]  Family History  Problem Relation Name Age of Onset   • Colon cancer Mother LR    • Stroke Mother LR    • Lung cancer Father JR    • Diabetes Father JR    • Dementia Maternal Aunt     • Dementia Maternal Uncle     • Lung cancer Paternal Aunt     • Brain cancer Paternal Aunt     • Heart attack Maternal Grandfather     [4]  Allergies  Allergen Reactions   • Azithromycin Other (See Comments)

## 2025-07-10 NOTE — TELEPHONE ENCOUNTER
"REASON FOR CONVERSATION: Hearing Loss    SYMPTOMS: patient with sudden hearing loss that started this morning.  Patient states that he has some mild ringing in his ear.  Patient has no other symptoms.     OTHER HEALTH INFORMATION: no    PROTOCOL DISPOSITION: Go to Office Now/Urgent Care    CARE ADVICE PROVIDED: To to Urgent Care to be evaluated now.  If your symptoms worsen or you get severe pain to to the EMERGENCY DEPARTMENT.     PRACTICE FOLLOW-UP: No    Reason for Disposition   Hearing loss in one or both ears of sudden onset and present now    Answer Assessment - Initial Assessment Questions  1. DESCRIPTION: \"What type of hearing problem are you having? Describe it for me.\" (e.g., complete hearing loss, partial loss)      Very muffled hearing out of patient's left ear   2. LOCATION: \"One or both ears?\" If one, ask: \"Which ear?\"      Left ear   3. SEVERITY: \"Can you hear anything?\" If Yes, ask: \"What can you hear?\" (e.g., ticking watch, whisper, talking)      Yes, I can hear a little bit   4. ONSET: \"When did this begin?\" \"Did it start suddenly or come on gradually?\"      It started this morning   5. PATTERN: \"Does this come and go, or has it been constant since it started?\"      Constant   6. PAIN: \"Is there any pain in your ear(s)?\"  (Scale 0-10; or none, mild, moderate, severe)      no  7. CAUSE: \"What do you think is causing this hearing problem?\"      I'm not sure   8. OTHER SYMPTOMS: \"Do you have any other symptoms?\" (e.g., dizziness, ringing in ears)      Mild ringing   9. PREGNANCY: \"Is there any chance you are pregnant?\" \"When was your last menstrual period?\"      no    Protocols used: Hearing Loss or Change-Adult-[place]    "

## 2025-07-10 NOTE — PATIENT INSTRUCTIONS
"Flonase 1 spray each nostril daily.  Pseudoephedrine 1-2 tablets every 6 hours as needed for congestion.  Increase your fluid intake.  Tylenol and/or Motrin as needed for pain or fever.  Follow up with your PCP for worsening or concerning symptoms    Patient Education     Eustachian tube problems   The Basics   Written by the doctors and editors at [facility]   What is the Eustachian tube? -- This tube connects the middle ear (the part of the ear behind the eardrum) to the back of the nose and throat (figure 1).  Normally, the Eustachian tube opens and closes. This helps keep the air pressure inside the middle ear the same as the air pressure outside the middle ear. If there is a problem with the tube opening, the air pressure inside the middle ear won't be the same as the air pressure outside it. This can cause ear pain, hearing loss, and other symptoms. \"Ear barotrauma\" is the medical term for when people have symptoms or damage in the middle ear because of air pressure differences.  In some people, the Eustachian tube does not close normally, but stays open all of the time. This can also cause symptoms like hearing the sound of your own voice and breathing.  Most Eustachian tube problems last only a short time and get better on their own. But they can sometimes lead to more serious conditions, such as:   A middle ear infection   A torn eardrum   Hearing loss  In children, long-term hearing loss from Eustachian tube problems can also lead to language or speech problems.  What causes Eustachian tube problems? -- Common causes are:   Illnesses or conditions that make the Eustachian tubes swollen or inflamed - These include colds, allergies, ear infections, or sinus infections. The sinuses are hollow areas in the bones of the face.   Sudden air pressure changes - These can happen when people fly in an airplane, scuba dive, or drive up to the mountains.   Growths that block the Eustachian tube   Being born with an " "abnormal Eustachian tube  What are the symptoms of a Eustachian tube problem? -- Common symptoms include:   Ear pain   Feeling pressure or fullness in the ear   Trouble hearing   Ringing in the ear   Feeling dizzy   Loud sounds of your own voice or your own breathing  Should I see a doctor or nurse? -- See your doctor or nurse if your symptoms are severe, get worse, or don't go away after a few days.  Will I need tests? -- Probably not. Your doctor or nurse should be able to tell if you have a Eustachian tube problem by learning about your symptoms and doing an exam.  If your symptoms are severe, last for a long time, or only affect 1 ear, your doctor or nurse might:   Have you see a special kind of doctor called an ear, nose, and throat (\"ENT\") doctor   Do tests to check your hearing   Do an imaging test - These create pictures of the inside of the body.  How are Eustachian tube problems treated? -- Treatment depends on what's causing the problem. Depending on your individual situation, your doctor might treat you with 1 or more of the following:   Nose sprays   Antihistamines - These medicines are usually used to treat allergies. They help stop itching, sneezing, and runny nose symptoms.   Decongestants - These medicines can help with stuffy nose symptoms.   Instructions to avoid dehydration - Drink a lot of fluids, especially before doing physical activity or being in the heat.   Surgery - Most people do not need surgery for Eustachian tube problems. But people might need surgery if their symptoms don't get better with medicines or they have severe or long-term symptoms.  Antibiotics are not needed to treat Eustachian tube problems. But they might be needed if a person has an ear infection.  All topics are updated as new evidence becomes available and our peer review process is complete.  This topic retrieved from [employer] on: May 19, 2024.  Topic 89129 Version 17.0  Release: 32.4.3 - C32.138  © 2024 [facility], " Inc. and/or its affiliates. All rights reserved.  figure 1: Eustachian tube     The Eustachian tube is a tube that connects the middle ear (the part of the ear behind the eardrum) to the back of the nose and throat.  Graphic 17169 Version 2.0  Consumer Information Use and Disclaimer   Disclaimer: This generalized information is a limited summary of diagnosis, treatment, and/or medication information. It is not meant to be comprehensive and should be used as a tool to help the user understand and/or assess potential diagnostic and treatment options. It does NOT include all information about conditions, treatments, medications, side effects, or risks that may apply to a specific patient. It is not intended to be medical advice or a substitute for the medical advice, diagnosis, or treatment of a health care provider based on the health care provider's examination and assessment of a patient's specific and unique circumstances. Patients must speak with a health care provider for complete information about their health, medical questions, and treatment options, including any risks or benefits regarding use of medications. This information does not endorse any treatments or medications as safe, effective, or approved for treating a specific patient. [employer] and its affiliates disclaim any warranty or liability relating to this information or the use thereof.The use of this information is governed by the Terms of Use, available at https://www.[employer]uwer.com/en/know/clinical-effectiveness-terms. 2024© [employer], Inc. and its affiliates and/or licensors. All rights reserved.  Copyright   © 2024 [employer], Inc. and/or its affiliates. All rights reserved.

## 2025-08-07 ENCOUNTER — OFFICE VISIT (OUTPATIENT)
Dept: INTERNAL MEDICINE CLINIC | Facility: CLINIC | Age: 50
End: 2025-08-07
Payer: COMMERCIAL

## 2025-08-07 ENCOUNTER — APPOINTMENT (OUTPATIENT)
Dept: LAB | Facility: CLINIC | Age: 50
End: 2025-08-07
Attending: INTERNAL MEDICINE
Payer: COMMERCIAL

## 2025-08-14 ENCOUNTER — TRANSCRIBE ORDERS (OUTPATIENT)
Dept: SLEEP CENTER | Facility: CLINIC | Age: 50
End: 2025-08-14